# Patient Record
Sex: FEMALE | Race: BLACK OR AFRICAN AMERICAN | NOT HISPANIC OR LATINO | URBAN - METROPOLITAN AREA
[De-identification: names, ages, dates, MRNs, and addresses within clinical notes are randomized per-mention and may not be internally consistent; named-entity substitution may affect disease eponyms.]

---

## 2017-01-01 NOTE — PROVIDER CONTACT NOTE (OTHER) - BACKGROUND
S/P repeat C/S @ 39.3 weeks
s/p c/s at 1526. Intra op EBL 2000cc. Pt admitted to PACU at 1651.
repeat c/s 12/31, EBL 2000

## 2017-01-02 NOTE — LACTATION INITIAL EVALUATION - INTERVENTION OUTCOME
verbalizes understanding/demonstrates understanding of teaching/mother fitted for nipple shield with instruction and  not maintaining latch , mother shown how to hand express and fed  1/2 tsp of expressed colostrum and mother to hand express or breast pump every 2-3 hours and give 10-15cc of expressed colostrum and discussed feeding cues and to breastfeed 8-12 times in 24 hours and discussed breastfeeding log and assist prn, RN aware of plan/good return demonstration

## 2017-01-02 NOTE — DISCHARGE NOTE OB - PATIENT PORTAL LINK FT
“You can access the [facility] Patient Portal, offered by [facility], by registering with the following website: [email or web address]”

## 2017-01-02 NOTE — DISCHARGE NOTE OB - CARE PROVIDER_API CALL
Kayy Palumbo (MD), Obstetrics and Gynecology  [address] [place] [address]  Phone: [phone]  Fax: [phone]

## 2017-01-02 NOTE — DISCHARGE NOTE OB - MEDICATION SUMMARY - MEDICATIONS TO TAKE
I will START or STAY ON the medications listed below when I get home from the hospital:    prenatal vitamin  -- 1 tab(s) by mouth once a day  -- Indication: For breastfeeding    acetaminophen-oxyCODONE 325 mg-5 mg oral tablet  -- 1 tab(s) by mouth every 6 hours, As Needed -for moderate pain MDD:4  -- Caution federal law prohibits the transfer of this drug to any person other  than the person for whom it was prescribed.  May cause drowsiness.  Alcohol may intensify this effect.  Use care when operating dangerous machinery.  This prescription cannot be refilled.  This product contains acetaminophen.  Do not use  with any other product containing acetaminophen to prevent possible liver damage.  Using more of this medication than prescribed may cause serious breathing problems.    -- Indication: For pain

## 2017-01-02 NOTE — DISCHARGE NOTE OB - CARE PLAN
Principal Discharge DX:	 delivery, delivered, current hospitalization  Goal:	normal postoperative course  Instructions for follow-up, activity and diet:	nothing in vagina, no heavy lifting  Secondary Diagnosis:	Mild intermittent asthma without complication  Secondary Diagnosis:	History of liver biopsy  Secondary Diagnosis:	Maternal care due to low transverse uterine scar from previous  delivery  Secondary Diagnosis:	S/P lumpectomy of breast  Secondary Diagnosis:	S/P T&A (status post tonsillectomy and adenoidectomy)  Secondary Diagnosis:	S/P thyroid biopsy

## 2017-01-02 NOTE — DISCHARGE NOTE OB - SECONDARY DIAGNOSIS.
Mild intermittent asthma without complication History of liver biopsy Maternal care due to low transverse uterine scar from previous  delivery S/P lumpectomy of breast S/P T&A (status post tonsillectomy and adenoidectomy) S/P thyroid biopsy

## 2017-01-03 NOTE — PROVIDER CONTACT NOTE (OTHER) - SITUATION
cs 12/31 at 15:26
Urine output decreased, 25cc for past 1 hour, 55cc for previous hour. Pt 104KG.
incision oozing, MD sutherland saw this AM, applied pad herself to soak up drainage, incision still oozing this afternoon, MD Quintana made aware, MD Sutherland ordered CBC this AM
low urine output @ 21:00=30cc

## 2017-01-03 NOTE — PROVIDER CONTACT NOTE (OTHER) - ACTION/TREATMENT ORDERED:
OB resident made aware- pt. will follow up w urology outpt.
another pad applied to soak up drainage
IVF bolus 500cc
LR 500cc bolus started @ 19:02. To continue to assess.

## 2017-01-03 NOTE — PROVIDER CONTACT NOTE (OTHER) - ASSESSMENT
pt. requested bedpan, states that she has difficulty making it to bathroom when she has to void. denies pain or burning, states OB is aware and she has had this issue prior to and during preg.
vss, cb wnl
VSS. Uterus firm and 2 F above the umbilicus, lochia scant. Dressing CDI. IVF at 125cc/h. Urine dark yellow.
s/p [id number] bolus completed @ 19:45. urine output @ 20:00=25cc

## 2017-09-05 ENCOUNTER — OUTPATIENT (OUTPATIENT)
Dept: OUTPATIENT SERVICES | Facility: HOSPITAL | Age: 37
LOS: 1 days | End: 2017-09-05

## 2017-09-05 VITALS
DIASTOLIC BLOOD PRESSURE: 80 MMHG | OXYGEN SATURATION: 99 % | HEIGHT: 62 IN | WEIGHT: 218.04 LBS | SYSTOLIC BLOOD PRESSURE: 112 MMHG | HEART RATE: 67 BPM | RESPIRATION RATE: 14 BRPM | TEMPERATURE: 97 F

## 2017-09-05 DIAGNOSIS — Z98.890 OTHER SPECIFIED POSTPROCEDURAL STATES: Chronic | ICD-10-CM

## 2017-09-05 DIAGNOSIS — Z90.89 ACQUIRED ABSENCE OF OTHER ORGANS: Chronic | ICD-10-CM

## 2017-09-05 DIAGNOSIS — Z98.891 HISTORY OF UTERINE SCAR FROM PREVIOUS SURGERY: Chronic | ICD-10-CM

## 2017-09-05 DIAGNOSIS — J45.909 UNSPECIFIED ASTHMA, UNCOMPLICATED: ICD-10-CM

## 2017-09-05 DIAGNOSIS — G47.33 OBSTRUCTIVE SLEEP APNEA (ADULT) (PEDIATRIC): ICD-10-CM

## 2017-09-05 DIAGNOSIS — Z18.9 RETAINED FOREIGN BODY FRAGMENTS, UNSPECIFIED MATERIAL: ICD-10-CM

## 2017-09-05 DIAGNOSIS — M79.672 PAIN IN LEFT FOOT: ICD-10-CM

## 2017-09-05 DIAGNOSIS — Z98.89 OTHER SPECIFIED POSTPROCEDURAL STATES: Chronic | ICD-10-CM

## 2017-09-05 LAB
ALBUMIN SERPL ELPH-MCNC: 4.3 G/DL — SIGNIFICANT CHANGE UP (ref 3.3–5)
ALP SERPL-CCNC: 92 U/L — SIGNIFICANT CHANGE UP (ref 40–120)
ALT FLD-CCNC: 15 U/L — SIGNIFICANT CHANGE UP (ref 4–33)
AST SERPL-CCNC: 19 U/L — SIGNIFICANT CHANGE UP (ref 4–32)
BILIRUB SERPL-MCNC: 0.2 MG/DL — SIGNIFICANT CHANGE UP (ref 0.2–1.2)
BUN SERPL-MCNC: 16 MG/DL — SIGNIFICANT CHANGE UP (ref 7–23)
CALCIUM SERPL-MCNC: 9.4 MG/DL — SIGNIFICANT CHANGE UP (ref 8.4–10.5)
CHLORIDE SERPL-SCNC: 103 MMOL/L — SIGNIFICANT CHANGE UP (ref 98–107)
CO2 SERPL-SCNC: 27 MMOL/L — SIGNIFICANT CHANGE UP (ref 22–31)
CREAT SERPL-MCNC: 0.65 MG/DL — SIGNIFICANT CHANGE UP (ref 0.5–1.3)
GLUCOSE SERPL-MCNC: 87 MG/DL — SIGNIFICANT CHANGE UP (ref 70–99)
HCT VFR BLD CALC: 40.4 % — SIGNIFICANT CHANGE UP (ref 34.5–45)
HGB BLD-MCNC: 12.6 G/DL — SIGNIFICANT CHANGE UP (ref 11.5–15.5)
MCHC RBC-ENTMCNC: 26.7 PG — LOW (ref 27–34)
MCHC RBC-ENTMCNC: 31.2 % — LOW (ref 32–36)
MCV RBC AUTO: 85.6 FL — SIGNIFICANT CHANGE UP (ref 80–100)
NRBC # FLD: 0 — SIGNIFICANT CHANGE UP
PLATELET # BLD AUTO: 297 K/UL — SIGNIFICANT CHANGE UP (ref 150–400)
PMV BLD: 10.3 FL — SIGNIFICANT CHANGE UP (ref 7–13)
POTASSIUM SERPL-MCNC: 4.2 MMOL/L — SIGNIFICANT CHANGE UP (ref 3.5–5.3)
POTASSIUM SERPL-SCNC: 4.2 MMOL/L — SIGNIFICANT CHANGE UP (ref 3.5–5.3)
PROT SERPL-MCNC: 7.8 G/DL — SIGNIFICANT CHANGE UP (ref 6–8.3)
RBC # BLD: 4.72 M/UL — SIGNIFICANT CHANGE UP (ref 3.8–5.2)
RBC # FLD: 13.5 % — SIGNIFICANT CHANGE UP (ref 10.3–14.5)
SODIUM SERPL-SCNC: 141 MMOL/L — SIGNIFICANT CHANGE UP (ref 135–145)
WBC # BLD: 4.85 K/UL — SIGNIFICANT CHANGE UP (ref 3.8–10.5)
WBC # FLD AUTO: 4.85 K/UL — SIGNIFICANT CHANGE UP (ref 3.8–10.5)

## 2017-09-05 NOTE — H&P PST ADULT - PROBLEM SELECTOR PLAN 1
pt is scheduled for removal of foreign body in left foot on 9/7/17. Preop instructions including Hibiclens, Pepcid, NPO status provided. Pt to finish AXB ordered (ends on 9/6) and may continue PRN percocet as ordered.

## 2017-09-05 NOTE — H&P PST ADULT - NEGATIVE GENERAL GENITOURINARY SYMPTOMS
no hematuria/no nocturia/no urinary hesitancy/no renal colic/no incontinence/normal urinary frequency/no flank pain R/no flank pain L/no dysuria/no urine discoloration/no bladder infections

## 2017-09-05 NOTE — H&P PST ADULT - NEGATIVE GASTROINTESTINAL SYMPTOMS
no melena/no diarrhea/no flatulence/no abdominal pain/no vomiting/no change in bowel habits/no constipation/no nausea

## 2017-09-05 NOTE — H&P PST ADULT - MUSCULOSKELETAL COMMENTS
DX: DX: pain in left foot, retained foreign body fragments unspecified material: left. Specialty boot and dsg in place, dci. DX: pain in left foot, retained foreign body fragments unspecified material: left

## 2017-09-05 NOTE — H&P PST ADULT - PSH
H/O myomectomy    H/O:   2010  History of liver biopsy  2015  S/P     S/P lumpectomy of breast  left   S/P T&A (status post tonsillectomy and adenoidectomy)    S/P thyroid biopsy  2012 benign

## 2017-09-05 NOTE — H&P PST ADULT - NEGATIVE ALLERGY TYPES
no reactions to animals/no outdoor environmental allergies/no reactions to insect bites/no reactions to medicines/no reactions to food/no indoor environmental allergies

## 2017-09-05 NOTE — H&P PST ADULT - RS GEN PE MLT RESP DETAILS PC
respirations non-labored/no rales/good air movement/airway patent/no rhonchi/breath sounds equal/no wheezes

## 2017-09-05 NOTE — H&P PST ADULT - HISTORY OF PRESENT ILLNESS
37 y/o female with PMH of Asthma (denies intubations or hospitalizations). Presents to [facility] with a preop dx of pain in left foot, retained foreign body fragments unspecified material: left, and to be evaluated for a scheduled removal of foreign body in left foot on 9/7/17. Pt states was cleaning at home and stepped on a needle that was unable to remove. Pt saw Dr Bocanegra who recommended surgical intervention at this time.

## 2017-09-05 NOTE — H&P PST ADULT - FAMILY HISTORY
Mother  Still living? Yes, Estimated age: Age Unknown  Family history of hypertension, Age at diagnosis: Age Unknown  Family history of hyperlipidemia, Age at diagnosis: Age Unknown     Grandparent  Still living? No  Family history of diabetes mellitus (DM), Age at diagnosis: Age Unknown  Family history of colon cancer, Age at diagnosis: Age Unknown  Family history of CHF (congestive heart failure), Age at diagnosis: Age Unknown

## 2017-09-05 NOTE — H&P PST ADULT - NEGATIVE PSYCHIATRIC SYMPTOMS
no insomnia/no memory loss/no paranoia/no mood swings/no anxiety/no agitation/no auditory hallucinations/no hyperactivity/no visual hallucinations/no suicidal ideation/no depression

## 2017-09-05 NOTE — H&P PST ADULT - NEGATIVE MUSCULOSKELETAL SYMPTOMS
no stiffness/no arm pain L/no neck pain/no joint swelling/no muscle cramps/no muscle weakness/no back pain/no leg pain R/no arthralgia/no arthritis/no myalgia

## 2017-09-05 NOTE — H&P PST ADULT - NEGATIVE NEUROLOGICAL SYMPTOMS
no vertigo/no weakness/no loss of sensation/no difficulty walking/no confusion/no headache/no paresthesias/no facial palsy/no focal seizures/no hemiparesis/no transient paralysis/no syncope/no generalized seizures/no loss of consciousness/no tremors

## 2017-09-05 NOTE — H&P PST ADULT - NSANTHOSAYNRD_GEN_A_CORE
Never tested/No. QUETA screening performed.  STOP BANG Legend: 0-2 = LOW Risk; 3-4 = INTERMEDIATE Risk; 5-8 = HIGH Risk

## 2017-09-05 NOTE — H&P PST ADULT - NEGATIVE CARDIOVASCULAR SYMPTOMS
no orthopnea/no claudication/no peripheral edema/no palpitations/no chest pain/no paroxysmal nocturnal dyspnea/no dyspnea on exertion

## 2017-09-05 NOTE — H&P PST ADULT - ATTENDING COMMENTS
I agree with above. Patient presents for removal of left foot foreign body of hallux. All risks and benefits have been discussed in depth. Consent signed. Post operative instructions discussed with patient. She agrees to surgery.

## 2017-09-05 NOTE — H&P PST ADULT - NEGATIVE OPHTHALMOLOGIC SYMPTOMS
no pain L/no irritation L/no diplopia/no loss of vision R/no scleral injection L/no lacrimation R/no blurred vision R/no lacrimation L/no photophobia/no blurred vision L/no discharge R/no discharge L/no pain R/no scleral injection R/no irritation R/no loss of vision L

## 2017-09-05 NOTE — H&P PST ADULT - PMH
Asthma    Liver lesion    Maternal care due to uterine scar from previous surgery    Multinodular goiter

## 2017-09-06 ENCOUNTER — TRANSCRIPTION ENCOUNTER (OUTPATIENT)
Age: 37
End: 2017-09-06

## 2017-09-07 ENCOUNTER — OUTPATIENT (OUTPATIENT)
Dept: OUTPATIENT SERVICES | Facility: HOSPITAL | Age: 37
LOS: 1 days | Discharge: HOME | End: 2017-09-07
Payer: COMMERCIAL

## 2017-09-07 ENCOUNTER — RESULT REVIEW (OUTPATIENT)
Age: 37
End: 2017-09-07

## 2017-09-07 VITALS
RESPIRATION RATE: 16 BRPM | OXYGEN SATURATION: 100 % | TEMPERATURE: 36 F | HEART RATE: 78 BPM | WEIGHT: 218.04 LBS | SYSTOLIC BLOOD PRESSURE: 126 MMHG | DIASTOLIC BLOOD PRESSURE: 93 MMHG | HEIGHT: 61.81 IN

## 2017-09-07 VITALS — HEART RATE: 57 BPM | OXYGEN SATURATION: 99 % | RESPIRATION RATE: 18 BRPM

## 2017-09-07 DIAGNOSIS — Z18.9 RETAINED FOREIGN BODY FRAGMENTS, UNSPECIFIED MATERIAL: ICD-10-CM

## 2017-09-07 DIAGNOSIS — Z98.890 OTHER SPECIFIED POSTPROCEDURAL STATES: Chronic | ICD-10-CM

## 2017-09-07 DIAGNOSIS — Z98.89 OTHER SPECIFIED POSTPROCEDURAL STATES: Chronic | ICD-10-CM

## 2017-09-07 DIAGNOSIS — Z98.891 HISTORY OF UTERINE SCAR FROM PREVIOUS SURGERY: Chronic | ICD-10-CM

## 2017-09-07 DIAGNOSIS — Z90.89 ACQUIRED ABSENCE OF OTHER ORGANS: Chronic | ICD-10-CM

## 2017-09-07 PROCEDURE — 88300 SURGICAL PATH GROSS: CPT | Mod: 26

## 2017-09-07 NOTE — ASU DISCHARGE PLAN (ADULT/PEDIATRIC). - NOTIFY
Unable to Urinate/Persistent Nausea and Vomiting/Pain not relieved by Medications/Swelling that continues/Fever greater than 101/Inability to Tolerate Liquids or Foods/Bleeding that does not stop

## 2017-09-08 NOTE — BRIEF OPERATIVE NOTE - OPERATION/FINDINGS
L hallux foreign body removal. Sewing needle. Broken at sharp end, c/w pre-op xrays. No purulence, no acute SOI.

## 2017-10-02 ENCOUNTER — EMERGENCY (EMERGENCY)
Facility: HOSPITAL | Age: 37
LOS: 1 days | Discharge: HOME | End: 2017-10-02
Attending: EMERGENCY MEDICINE | Admitting: EMERGENCY MEDICINE
Payer: COMMERCIAL

## 2017-10-02 VITALS
TEMPERATURE: 99 F | RESPIRATION RATE: 18 BRPM | SYSTOLIC BLOOD PRESSURE: 134 MMHG | HEIGHT: 62 IN | WEIGHT: 220.02 LBS | DIASTOLIC BLOOD PRESSURE: 94 MMHG | HEART RATE: 95 BPM | OXYGEN SATURATION: 99 %

## 2017-10-02 VITALS
SYSTOLIC BLOOD PRESSURE: 135 MMHG | TEMPERATURE: 99 F | OXYGEN SATURATION: 100 % | RESPIRATION RATE: 17 BRPM | HEART RATE: 66 BPM | DIASTOLIC BLOOD PRESSURE: 65 MMHG

## 2017-10-02 DIAGNOSIS — Z98.89 OTHER SPECIFIED POSTPROCEDURAL STATES: Chronic | ICD-10-CM

## 2017-10-02 DIAGNOSIS — Z98.890 OTHER SPECIFIED POSTPROCEDURAL STATES: Chronic | ICD-10-CM

## 2017-10-02 DIAGNOSIS — Z98.891 HISTORY OF UTERINE SCAR FROM PREVIOUS SURGERY: Chronic | ICD-10-CM

## 2017-10-02 DIAGNOSIS — Z90.89 ACQUIRED ABSENCE OF OTHER ORGANS: Chronic | ICD-10-CM

## 2017-10-02 LAB
ALBUMIN SERPL ELPH-MCNC: 3.9 G/DL — SIGNIFICANT CHANGE UP (ref 3.4–5)
ALP SERPL-CCNC: 102 U/L — SIGNIFICANT CHANGE UP (ref 40–120)
ALT FLD-CCNC: 22 U/L — SIGNIFICANT CHANGE UP (ref 12–42)
ANION GAP SERPL CALC-SCNC: 5 MMOL/L — LOW (ref 9–16)
APPEARANCE UR: CLEAR — SIGNIFICANT CHANGE UP
APTT BLD: 34.8 SEC — SIGNIFICANT CHANGE UP (ref 27.5–36.5)
AST SERPL-CCNC: 18 U/L — SIGNIFICANT CHANGE UP (ref 15–37)
BASOPHILS NFR BLD AUTO: 0.2 % — SIGNIFICANT CHANGE UP (ref 0–2)
BILIRUB SERPL-MCNC: 0.3 MG/DL — SIGNIFICANT CHANGE UP (ref 0.2–1.2)
BILIRUB UR-MCNC: NEGATIVE — SIGNIFICANT CHANGE UP
BUN SERPL-MCNC: 17 MG/DL — SIGNIFICANT CHANGE UP (ref 7–23)
CALCIUM SERPL-MCNC: 9.1 MG/DL — SIGNIFICANT CHANGE UP (ref 8.5–10.5)
CHLORIDE SERPL-SCNC: 101 MMOL/L — SIGNIFICANT CHANGE UP (ref 96–108)
CK SERPL-CCNC: 99 U/L — SIGNIFICANT CHANGE UP (ref 26–192)
CO2 SERPL-SCNC: 28 MMOL/L — SIGNIFICANT CHANGE UP (ref 22–31)
COLOR SPEC: YELLOW — SIGNIFICANT CHANGE UP
CREAT SERPL-MCNC: 0.82 MG/DL — SIGNIFICANT CHANGE UP (ref 0.5–1.3)
D DIMER BLD IA.RAPID-MCNC: <150 NG/ML DDU — SIGNIFICANT CHANGE UP
DIFF PNL FLD: NEGATIVE — SIGNIFICANT CHANGE UP
EOSINOPHIL NFR BLD AUTO: 1.3 % — SIGNIFICANT CHANGE UP (ref 0–6)
GLUCOSE SERPL-MCNC: 90 MG/DL — SIGNIFICANT CHANGE UP (ref 70–99)
GLUCOSE UR QL: NEGATIVE — SIGNIFICANT CHANGE UP
HCG UR QL: NEGATIVE — SIGNIFICANT CHANGE UP
HCT VFR BLD CALC: 36.7 % — SIGNIFICANT CHANGE UP (ref 34.5–45)
HGB BLD-MCNC: 11.9 G/DL — SIGNIFICANT CHANGE UP (ref 11.5–15.5)
IMM GRANULOCYTES NFR BLD AUTO: 0.2 % — SIGNIFICANT CHANGE UP (ref 0–1.5)
INR BLD: 1.14 — SIGNIFICANT CHANGE UP (ref 0.88–1.16)
KETONES UR-MCNC: NEGATIVE — SIGNIFICANT CHANGE UP
LEUKOCYTE ESTERASE UR-ACNC: NEGATIVE — SIGNIFICANT CHANGE UP
LYMPHOCYTES # BLD AUTO: 43.5 % — SIGNIFICANT CHANGE UP (ref 13–44)
MCHC RBC-ENTMCNC: 27.6 PG — SIGNIFICANT CHANGE UP (ref 27–34)
MCHC RBC-ENTMCNC: 32.4 G/DL — SIGNIFICANT CHANGE UP (ref 32–36)
MCV RBC AUTO: 85.2 FL — SIGNIFICANT CHANGE UP (ref 80–100)
MONOCYTES NFR BLD AUTO: 9.1 % — SIGNIFICANT CHANGE UP (ref 2–14)
NEUTROPHILS NFR BLD AUTO: 45.7 % — SIGNIFICANT CHANGE UP (ref 43–77)
NITRITE UR-MCNC: NEGATIVE — SIGNIFICANT CHANGE UP
PH UR: 7 — SIGNIFICANT CHANGE UP (ref 5–8)
PLATELET # BLD AUTO: 277 K/UL — SIGNIFICANT CHANGE UP (ref 150–400)
POTASSIUM SERPL-MCNC: 4 MMOL/L — SIGNIFICANT CHANGE UP (ref 3.5–5.3)
POTASSIUM SERPL-SCNC: 4 MMOL/L — SIGNIFICANT CHANGE UP (ref 3.5–5.3)
PROT SERPL-MCNC: 7.9 G/DL — SIGNIFICANT CHANGE UP (ref 6.4–8.2)
PROT UR-MCNC: NEGATIVE MG/DL — SIGNIFICANT CHANGE UP
PROTHROM AB SERPL-ACNC: 12.6 SEC — SIGNIFICANT CHANGE UP (ref 9.8–12.7)
RBC # BLD: 4.31 M/UL — SIGNIFICANT CHANGE UP (ref 3.8–5.2)
RBC # FLD: 13.8 % — SIGNIFICANT CHANGE UP (ref 10.3–16.9)
SODIUM SERPL-SCNC: 134 MMOL/L — SIGNIFICANT CHANGE UP (ref 132–145)
SP GR SPEC: 1.01 — SIGNIFICANT CHANGE UP (ref 1–1.03)
TROPONIN I SERPL-MCNC: <0.017 NG/ML — LOW (ref 0.02–0.06)
TSH SERPL-MCNC: 1.16 UIU/ML — SIGNIFICANT CHANGE UP (ref 0.36–3.74)
UROBILINOGEN FLD QL: 0.2 E.U./DL — SIGNIFICANT CHANGE UP
WBC # BLD: 5.4 K/UL — SIGNIFICANT CHANGE UP (ref 3.8–10.5)
WBC # FLD AUTO: 5.4 K/UL — SIGNIFICANT CHANGE UP (ref 3.8–10.5)

## 2017-10-02 PROCEDURE — 93010 ELECTROCARDIOGRAM REPORT: CPT | Mod: NC

## 2017-10-02 PROCEDURE — 71010: CPT | Mod: 26

## 2017-10-02 PROCEDURE — 99285 EMERGENCY DEPT VISIT HI MDM: CPT | Mod: 25

## 2017-10-02 RX ORDER — MECLIZINE HCL 12.5 MG
1 TABLET ORAL
Qty: 30 | Refills: 0 | OUTPATIENT
Start: 2017-10-02 | End: 2017-10-12

## 2017-10-02 RX ORDER — MECLIZINE HCL 12.5 MG
25 TABLET ORAL ONCE
Qty: 0 | Refills: 0 | Status: COMPLETED | OUTPATIENT
Start: 2017-10-02 | End: 2017-10-02

## 2017-10-02 RX ORDER — ACETAMINOPHEN 500 MG
650 TABLET ORAL ONCE
Qty: 0 | Refills: 0 | Status: COMPLETED | OUTPATIENT
Start: 2017-10-02 | End: 2017-10-02

## 2017-10-02 RX ORDER — SODIUM CHLORIDE 9 MG/ML
1000 INJECTION INTRAMUSCULAR; INTRAVENOUS; SUBCUTANEOUS ONCE
Qty: 0 | Refills: 0 | Status: COMPLETED | OUTPATIENT
Start: 2017-10-02 | End: 2017-10-02

## 2017-10-02 RX ORDER — SODIUM CHLORIDE 9 MG/ML
3 INJECTION INTRAMUSCULAR; INTRAVENOUS; SUBCUTANEOUS ONCE
Qty: 0 | Refills: 0 | Status: COMPLETED | OUTPATIENT
Start: 2017-10-02 | End: 2017-10-02

## 2017-10-02 RX ADMIN — SODIUM CHLORIDE 3 MILLILITER(S): 9 INJECTION INTRAMUSCULAR; INTRAVENOUS; SUBCUTANEOUS at 19:31

## 2017-10-02 RX ADMIN — Medication 650 MILLIGRAM(S): at 19:30

## 2017-10-02 RX ADMIN — Medication 25 MILLIGRAM(S): at 20:55

## 2017-10-02 RX ADMIN — SODIUM CHLORIDE 1000 MILLILITER(S): 9 INJECTION INTRAMUSCULAR; INTRAVENOUS; SUBCUTANEOUS at 19:30

## 2017-10-02 NOTE — ED PROVIDER NOTE - OBJECTIVE STATEMENT
hx of goiter (not on meds) 9 months post partum presents with lightheadedness.  x 4-5 days, intermittent, worse with prolonged standing or standing from a seated position.  Denies headache, palpitations but endorses R sided chest pain.  No leg pain, swelling.  Denies exertional symptoms.  No travel history, hormone medication use.

## 2017-10-02 NOTE — ED SUB INTERN NOTE - GENERIC SYMPTOMS POSITIVE
pt c/o 3-4 nearsyncopal episodes a day over the past week. denies actual syncope. associated with midsternal chest pressure, blurred vision, photophobia and lightheadedness, recent substantial diet change as per pt.  episodes are exacerbated with exertion

## 2017-10-02 NOTE — ED PROVIDER NOTE - PROGRESS NOTE DETAILS
received pt in s/o from Dr. Fermin with d-dimer pending. Pt seen and examined by me, joannes. Pt endorses sudden onset waves of dizziness and "fogginess" with sudden head turning, no recent uri, air travel, diving, denies tinnitus.   exam: +Barany maneuever.  Imp: probable acute labyrinthitis  plan: meclizine.

## 2017-10-02 NOTE — ED PROVIDER NOTE - MEDICAL DECISION MAKING DETAILS
lightheadedness/near syncope x 4-5 days.  positional in nature also with R sided chest pain.  NO PE or DV risk factors.  No pertinent family history.  + goiter history, not treated.  Well appearing.  No murmurs, no LE edema.  Labs, fluids, urine, chest films.

## 2017-10-06 DIAGNOSIS — R42 DIZZINESS AND GIDDINESS: ICD-10-CM

## 2017-10-06 DIAGNOSIS — R07.89 OTHER CHEST PAIN: ICD-10-CM

## 2017-10-06 DIAGNOSIS — H83.09 LABYRINTHITIS, UNSPECIFIED EAR: ICD-10-CM

## 2019-10-24 NOTE — ASU PREOP CHECKLIST - VERIFY SURGICAL SITE/SIDE WITH PATIENT
Assessment and Plan


Assessment and plan: 





(1) Atrial fibrillation with RVR


Current Visit: Yes   Status: Acute   


Plan to address problem: 


Cardizem for rate control, cardiology consulted


On eliquis


Pt cardioverted S/P loading dose of cardizem.   Currently in SR





Hyperthyroidism


- TSH <0.005 and free t4 2.45


- patient was on methmazole and I d/lyly it because the NM scan was normal


- Thyroid U/S ordered








(2) Acute systolic CHF (congestive heart failure)


- On appropriate CHF medications








(3) Severe malnutrition


Current Visit: Yes   Status: Acute   


Plan to address problem: 


Encourage increased protein intake, dietary supplementation








(4) COPD (chronic obstructive pulmonary disease)


Current Visit: Yes   Status: Acute   


Qualifiers: 


   Chronic bronchitis type: mixed simple and mucopurulent 


Plan to address problem: 


supplemental oxygen, nebulizer therapy, NIPPV as clinically indicated. 








(5) Non compliance w medication regimen


Current Visit: Yes   Status: Acute   


Plan to address problem: 


Behavior change counseling








(6) DVT prophylaxis


Current Visit: Yes   Status: Acute   


Plan to address problem: 


SCD to BLE while in bed, therapeutic anticoagulation





History


Interval history: 





Patient was seen and evaluated this morning, patient has dizziness when he is 

walking. Patient is on IN oxygen.





Hospitalist Physical





- Physical exam


Narrative exam: 





 Not in cardiopulmonary distress. 


 The patient appeared well nourished and normally developed.


 Vital signs as documented.


 Head exam is unremarkable.


 No scleral icterus .


 Neck is without jugular venous distension, thyromegaly, or carotid bruits. 


 Lungs are clear to auscultation.


Cardiac exam reveals regular rate and  Rhythm.


Abdominal exam reveals normal bowel sounds. 


Extremities are nonedematous and both femoral and pedal pulses are normal.


CNS: Alert and oriented 3.  No focal weakness.





- Constitutional


Vitals: 


                                        











Temp Pulse Resp BP Pulse Ox


 


 98.3 F   76   20   137/86   97 


 


 10/24/19 08:06  10/24/19 08:06  10/24/19 08:06  10/24/19 08:06  10/24/19 08:06











General appearance: Present: no acute distress





Results





- Labs


CBC & Chem 7: 


                                 10/21/19 14:32





                                 10/23/19 06:01


Labs: 


                             Laboratory Last Values











WBC  5.0 K/mm3 (4.5-11.0)   10/21/19  14:32    


 


RBC  4.20 M/mm3 (3.65-5.03)   10/21/19  14:32    


 


Hgb  8.6 gm/dl (11.8-15.2)  L  10/21/19  14:32    


 


Hct  28.5 % (35.5-45.6)  L  10/21/19  14:32    


 


MCV  68 fl (84-94)  L  10/21/19  14:32    


 


MCH  21 pg (28-32)  L  10/21/19  14:32    


 


MCHC  30 % (32-34)  L  10/21/19  14:32    


 


RDW  21.9 % (13.2-15.2)  H  10/21/19  14:32    


 


Plt Count  149 K/mm3 (140-440)   10/21/19  14:32    


 


Lymph % (Auto)  23.5 % (13.4-35.0)   10/21/19  14:32    


 


Mono % (Auto)  14.1 % (0.0-7.3)  H  10/21/19  14:32    


 


Eos % (Auto)  0.6 % (0.0-4.3)   10/21/19  14:32    


 


Baso % (Auto)  0.3 % (0.0-1.8)   10/21/19  14:32    


 


Lymph #  1.2 K/mm3 (1.2-5.4)   10/21/19  14:32    


 


Mono #  0.7 K/mm3 (0.0-0.8)   10/21/19  14:32    


 


Eos #  0.0 K/mm3 (0.0-0.4)   10/21/19  14:32    


 


Baso #  0.0 K/mm3 (0.0-0.1)   10/21/19  14:32    


 


Seg Neutrophils %  61.5 % (40.0-70.0)   10/21/19  14:32    


 


Seg Neutrophils #  3.1 K/mm3 (1.8-7.7)   10/21/19  14:32    


 


PT  14.7 Sec. (12.2-14.9)   10/21/19  14:32    


 


INR  1.18  (0.87-1.13)  H  10/21/19  14:32    


 


APTT  37.5 Sec. (24.2-36.6)  H  10/21/19  14:32    


 


Sodium  140 mmol/L (137-145)   10/23/19  06:01    


 


Potassium  4.6 mmol/L (3.6-5.0)   10/23/19  06:01    


 


Chloride  105.1 mmol/L ()   10/23/19  06:01    


 


Carbon Dioxide  19 mmol/L (22-30)  L  10/23/19  06:01    


 


Anion Gap  21 mmol/L  10/23/19  06:01    


 


BUN  26 mg/dL (9-20)  H  10/23/19  06:01    


 


Creatinine  1.1 mg/dL (0.8-1.5)   10/23/19  06:01    


 


Estimated GFR  > 60 ml/min  10/23/19  06:01    


 


BUN/Creatinine Ratio  24 %  10/23/19  06:01    


 


Glucose  83 mg/dL ()   10/23/19  06:01    


 


POC Glucose  66  ()  L  10/22/19  16:46    


 


Lactic Acid  1.80 mmol/L (0.7-2.0)   10/21/19  16:16    


 


Calcium  8.6 mg/dL (8.4-10.2)   10/23/19  06:01    


 


Magnesium  2.20 mg/dL (1.7-2.3)   10/21/19  20:08    


 


Troponin T  < 0.010 ng/mL (0.00-0.029)   10/21/19  20:08    


 


NT-Pro-B Natriuret Pep  5264 pg/mL (0-900)  H  10/21/19  16:16    


 


TSH  < 0.005 mlU/mL (0.270-4.200)  L  10/21/19  16:16    


 


Free T4  1.90 ng/dL (0.76-1.46)  H  10/22/19  14:31    


 


Urine Color  Yellow  (Yellow)   10/21/19  16:30    


 


Urine Turbidity  Clear  (Clear)   10/21/19  16:30    


 


Urine pH  6.0  (5.0-7.0)   10/21/19  16:30    


 


Ur Specific Gravity  1.014  (1.003-1.030)   10/21/19  16:30    


 


Urine Protein  <15 mg/dl mg/dL (Negative)   10/21/19  16:30    


 


Urine Glucose (UA)  Neg mg/dL (Negative)   10/21/19  16:30    


 


Urine Ketones  Neg mg/dL (Negative)   10/21/19  16:30    


 


Urine Blood  Neg  (Negative)   10/21/19  16:30    


 


Urine Nitrite  Neg  (Negative)   10/21/19  16:30    


 


Urine Bilirubin  Neg  (Negative)   10/21/19  16:30    


 


Urine Urobilinogen  < 2.0 mg/dL (<2.0)   10/21/19  16:30    


 


Ur Leukocyte Esterase  Tr  (Negative)   10/21/19  16:30    


 


Urine WBC (Auto)  2.0 /HPF (0.0-6.0)   10/21/19  16:30    


 


Urine RBC (Auto)  1.0 /HPF (0.0-6.0)   10/21/19  16:30    


 


U Epithel Cells (Auto)  < 1.0 /HPF (0-13.0)   10/21/19  16:30    


 


Salicylates  < 0.3 mg/dL (2.8-20.0)  L  10/21/19  07:28    


 


Acetaminophen  < 5.0 ug/mL (10.0-30.0)  L  10/21/19  07:28    


 


Plasma/Serum Alcohol  < 0.01 % (0-0.07)   10/21/19  16:16    


 


Syphilis IgG Antibody  Non-reactive  (NonReactive)   10/22/19  15:00    














Active Medications





- Current Medications


Current Medications: 














Generic Name Dose Route Start Last Admin





  Trade Name Freq  PRN Reason Stop Dose Admin


 


Acetaminophen  500 mg  10/21/19 17:00  10/24/19 10:55





  Tylenol  PO   500 mg





  Q6H CLAUDIA   Administration


 


Apixaban  2.5 mg  10/22/19 12:00  10/24/19 10:55





  Eliquis  PO   2.5 mg





  Q12HR CLAUDIA   Administration





  Protocol  


 


Lisinopril  2.5 mg  10/24/19 10:00  10/24/19 10:55





  Zestril  PO   2.5 mg





  QDAY CLAUDIA   Administration


 


Metoprolol Succinate  50 mg  10/23/19 13:00  10/24/19 08:07





  Toprol Xl  PO   50 mg





  Q12H CLAUDIA   Administration


 


Ondansetron HCl  4 mg  10/21/19 16:30  10/22/19 21:17





  Zofran  IV   4 mg





  Q8H PRN   Administration





  Nausea And Vomiting  


 


Pantoprazole Sodium  40 mg  10/22/19 10:00  10/24/19 10:56





  Protonix  PO   40 mg





  DAILY CLAUDIA   Administration


 


Sodium Chloride  10 ml  10/21/19 22:00  10/24/19 11:02





  Sodium Chloride Flush Syringe 10 Ml  IV   10 ml





  BID CLAUDIA   Administration


 


Sodium Chloride  10 ml  10/21/19 16:30 





  Sodium Chloride Flush Syringe 10 Ml  IV  





  PRN PRN  





  LINE FLUSH  














Nutrition/Malnutrition Assess





- Dietary Evaluation


Nutrition/Malnutrition Findings: 


                                        





Nutrition Notes                                            Start:  10/22/19 

10:06


Freq:                                                      Status: Active       




Protocol:                                                                       




 Document     10/24/19 11:38  AP  (Rec: 10/24/19 11:54  AP  [id number])


 Co-Sign      10/24/19 11:38  KH


 Nutrition Notes


     Need for Assessment generated from:         RN Referral


     Current Diagnosis                           COPD


     Other Pertinent Diagnosis                   Pneu, GERD, Anemia, CHF, A-Fib


     Current Diet                                Consistent CHO/Cardiac


     Labs/Tests                                  Reviewed


     Pertinent Medications                       Reviewed


     Height                                      6 ft 3 in


     Weight                                      48.3 kg


     Ideal Body Weight (kg)                      89.09


     BMI                                         13.3


     Intake Prior to Admission                   Poor


     Weight Status                               Emaciated


     Subjective/Other Information                F/U for PO/ONS intakes. Pt


                                                 consumed 80% of bfast tray and


                                                 80% of ONS.


     Percent of energy/protein needs met:        85%/72%


     Burn                                        Absent


     Trauma                                      Absent


     Minimum of two criteria                     Yes


     Interpretation of Weight Loss (severe)      >7.5% in 3 months


     Body Fat Depletion                          Moderate depletion (severe)


     Muscle Mass                                 Moderate Depletion (severe)


     #1


      Nutrition Diagnosis                        Malnutrition


      Diagnosis Progress(for reassessment        Continues


       documentation)                            


     Is patient on ventilator?                   No


     Is Patient Ambulatory and/or Out of Bed     Yes


     REE-([facility]-ambulatory/OOB) [     1694.719


      [email or web address]]                               


     Kcal/Kg value to use for calculation        45


     Approximate Energy Requirements Using       2174


      kcal/Kg                                    


     Calculation Used for Recommendations        Kcal/kg


     Additional Notes                            PRO needs: 106-133g/day (1.2-1


                                                 .5g/kg/IBW 89 kg)


                                                 Fluid: 1ml/kcal or per MD


 Nutrition Intervention


     Change Diet Order:                          Continue Consistent CHO/


                                                 Cardiac diet.


     Add Supplement/Snack (indicate name/kcal    Ensure Enlive


      /protein )                                 


     Provides kCal:                              350


     Provides Protein (gm)                       20


     Goal #1                                     Pt meet >80% of kcal/PRO needs


                                                 via PO/ONS intakes.


     Anticipated Discharge Needs:                Cardiac diet


     Follow-Up By:                               10/25/19


     Additional Comments                         F/U for stable PO/ONS intakes. left foot/done

## 2019-12-23 ENCOUNTER — EMERGENCY (EMERGENCY)
Facility: HOSPITAL | Age: 39
LOS: 1 days | Discharge: HOME | End: 2019-12-23
Attending: HOSPITALIST | Admitting: HOSPITALIST
Payer: COMMERCIAL

## 2019-12-23 VITALS
RESPIRATION RATE: 20 BRPM | SYSTOLIC BLOOD PRESSURE: 156 MMHG | TEMPERATURE: 99 F | HEART RATE: 104 BPM | OXYGEN SATURATION: 100 % | DIASTOLIC BLOOD PRESSURE: 89 MMHG

## 2019-12-23 VITALS — SYSTOLIC BLOOD PRESSURE: 116 MMHG | DIASTOLIC BLOOD PRESSURE: 72 MMHG

## 2019-12-23 DIAGNOSIS — Z98.890 OTHER SPECIFIED POSTPROCEDURAL STATES: Chronic | ICD-10-CM

## 2019-12-23 DIAGNOSIS — Z98.89 OTHER SPECIFIED POSTPROCEDURAL STATES: Chronic | ICD-10-CM

## 2019-12-23 DIAGNOSIS — Z98.891 HISTORY OF UTERINE SCAR FROM PREVIOUS SURGERY: Chronic | ICD-10-CM

## 2019-12-23 DIAGNOSIS — Z90.89 ACQUIRED ABSENCE OF OTHER ORGANS: Chronic | ICD-10-CM

## 2019-12-23 LAB
ALBUMIN SERPL ELPH-MCNC: 3.8 G/DL — SIGNIFICANT CHANGE UP (ref 3.3–5)
ALP SERPL-CCNC: 81 U/L — SIGNIFICANT CHANGE UP (ref 40–120)
ALT FLD-CCNC: 12 U/L — SIGNIFICANT CHANGE UP (ref 4–33)
ANION GAP SERPL CALC-SCNC: 8 MMO/L — SIGNIFICANT CHANGE UP (ref 7–14)
APTT BLD: 28 SEC — SIGNIFICANT CHANGE UP (ref 27.5–36.3)
AST SERPL-CCNC: 14 U/L — SIGNIFICANT CHANGE UP (ref 4–32)
BASOPHILS # BLD AUTO: 0.03 K/UL — SIGNIFICANT CHANGE UP (ref 0–0.2)
BASOPHILS NFR BLD AUTO: 0.3 % — SIGNIFICANT CHANGE UP (ref 0–2)
BILIRUB SERPL-MCNC: < 0.2 MG/DL — LOW (ref 0.2–1.2)
BUN SERPL-MCNC: 10 MG/DL — SIGNIFICANT CHANGE UP (ref 7–23)
CALCIUM SERPL-MCNC: 9 MG/DL — SIGNIFICANT CHANGE UP (ref 8.4–10.5)
CHLORIDE SERPL-SCNC: 105 MMOL/L — SIGNIFICANT CHANGE UP (ref 98–107)
CO2 SERPL-SCNC: 23 MMOL/L — SIGNIFICANT CHANGE UP (ref 22–31)
CREAT SERPL-MCNC: 0.72 MG/DL — SIGNIFICANT CHANGE UP (ref 0.5–1.3)
EOSINOPHIL # BLD AUTO: 0.26 K/UL — SIGNIFICANT CHANGE UP (ref 0–0.5)
EOSINOPHIL NFR BLD AUTO: 3 % — SIGNIFICANT CHANGE UP (ref 0–6)
GLUCOSE SERPL-MCNC: 102 MG/DL — HIGH (ref 70–99)
HCT VFR BLD CALC: 32.9 % — LOW (ref 34.5–45)
HGB BLD-MCNC: 9.9 G/DL — LOW (ref 11.5–15.5)
IMM GRANULOCYTES NFR BLD AUTO: 0.5 % — SIGNIFICANT CHANGE UP (ref 0–1.5)
INR BLD: 1.03 — SIGNIFICANT CHANGE UP (ref 0.88–1.17)
LYMPHOCYTES # BLD AUTO: 2.09 K/UL — SIGNIFICANT CHANGE UP (ref 1–3.3)
LYMPHOCYTES # BLD AUTO: 24.1 % — SIGNIFICANT CHANGE UP (ref 13–44)
MCHC RBC-ENTMCNC: 24.6 PG — LOW (ref 27–34)
MCHC RBC-ENTMCNC: 30.1 % — LOW (ref 32–36)
MCV RBC AUTO: 81.8 FL — SIGNIFICANT CHANGE UP (ref 80–100)
MONOCYTES # BLD AUTO: 0.81 K/UL — SIGNIFICANT CHANGE UP (ref 0–0.9)
MONOCYTES NFR BLD AUTO: 9.3 % — SIGNIFICANT CHANGE UP (ref 2–14)
NEUTROPHILS # BLD AUTO: 5.45 K/UL — SIGNIFICANT CHANGE UP (ref 1.8–7.4)
NEUTROPHILS NFR BLD AUTO: 62.8 % — SIGNIFICANT CHANGE UP (ref 43–77)
NRBC # FLD: 0 K/UL — SIGNIFICANT CHANGE UP (ref 0–0)
PLATELET # BLD AUTO: 311 K/UL — SIGNIFICANT CHANGE UP (ref 150–400)
PMV BLD: 9.1 FL — SIGNIFICANT CHANGE UP (ref 7–13)
POTASSIUM SERPL-MCNC: 3.9 MMOL/L — SIGNIFICANT CHANGE UP (ref 3.5–5.3)
POTASSIUM SERPL-SCNC: 3.9 MMOL/L — SIGNIFICANT CHANGE UP (ref 3.5–5.3)
PROT SERPL-MCNC: 6.6 G/DL — SIGNIFICANT CHANGE UP (ref 6–8.3)
PROTHROM AB SERPL-ACNC: 11.8 SEC — SIGNIFICANT CHANGE UP (ref 9.8–13.1)
RBC # BLD: 4.02 M/UL — SIGNIFICANT CHANGE UP (ref 3.8–5.2)
RBC # FLD: 15.8 % — HIGH (ref 10.3–14.5)
SODIUM SERPL-SCNC: 136 MMOL/L — SIGNIFICANT CHANGE UP (ref 135–145)
TROPONIN T, HIGH SENSITIVITY: < 6 NG/L — SIGNIFICANT CHANGE UP (ref ?–14)
WBC # BLD: 8.68 K/UL — SIGNIFICANT CHANGE UP (ref 3.8–10.5)
WBC # FLD AUTO: 8.68 K/UL — SIGNIFICANT CHANGE UP (ref 3.8–10.5)

## 2019-12-23 PROCEDURE — 99284 EMERGENCY DEPT VISIT MOD MDM: CPT

## 2019-12-23 PROCEDURE — 71275 CT ANGIOGRAPHY CHEST: CPT | Mod: 26

## 2019-12-23 RX ORDER — IPRATROPIUM/ALBUTEROL SULFATE 18-103MCG
3 AEROSOL WITH ADAPTER (GRAM) INHALATION ONCE
Refills: 0 | Status: COMPLETED | OUTPATIENT
Start: 2019-12-23 | End: 2019-12-23

## 2019-12-23 RX ADMIN — Medication 3 MILLILITER(S): at 17:18

## 2019-12-23 NOTE — ED ADULT TRIAGE NOTE - CHIEF COMPLAINT QUOTE
pt coming with CP and palpitation and SOB started yest.  denies dizziness, pt took ASA this AM/ zoponex, pt on Levoflex sine last wk 12/13 for cough /SOB.

## 2019-12-23 NOTE — ED PROVIDER NOTE - OBJECTIVE STATEMENT
39F with hx of Asthma, Brain tumor s/p resection and  shunt p/w chest tightness and ambriz for the past week. patient states she saw her PMD who started her on steroids and Levaquin, steroids completed, still on Levaquin for another week. cxr preformed with PMD clear. sx have not improved. no cough or sick contacts. (+) recent travel, flew back from the [facility] this past Friday. no ocp use, calf swelling. states she had imaging of her calves with PMD and no blood clots noted.

## 2019-12-23 NOTE — ED PROVIDER NOTE - PROGRESS NOTE DETAILS
Luis Alfredo: CTA chest results d/w patient. enlarged thyroid with mild tracheal compression is not a new finding. 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

## 2019-12-23 NOTE — ED PROVIDER NOTE - PMH
Asthma    Liver lesion    Maternal care due to uterine scar from previous surgery    Multinodular goiter    No pertinent past medical history

## 2019-12-23 NOTE — ED ADULT NURSE NOTE - OBJECTIVE STATEMENT
38 y/o female presents to ED ambulatory, A&Ox4 c/o chest pain, SOB, diaphoresis, tingling to left arm since yesterday.  Pt states she took her mother's Lopressor 12.5mg due to HTN with no relief.  Lungs CTA.

## 2020-01-14 NOTE — PACU DISCHARGE NOTE - THE ANESTHESIA ORDERS USED IN THE PACU ORDER SET WILL BE DISCONTINUED UPON TRANSFER OF THIS PATIENT
Statement Selected

## 2020-04-26 ENCOUNTER — MESSAGE (OUTPATIENT)
Age: 40
End: 2020-04-26

## 2020-04-30 ENCOUNTER — APPOINTMENT (OUTPATIENT)
Dept: DISASTER EMERGENCY | Facility: CLINIC | Age: 40
End: 2020-04-30

## 2020-04-30 LAB
SARS-COV-2 IGG SERPL IA-ACNC: 5.3 INDEX
SARS-COV-2 IGG SERPL QL IA: POSITIVE

## 2020-05-07 ENCOUNTER — LABORATORY RESULT (OUTPATIENT)
Age: 40
End: 2020-05-07

## 2020-05-15 ENCOUNTER — TRANSCRIPTION ENCOUNTER (OUTPATIENT)
Age: 40
End: 2020-05-15

## 2020-08-18 ENCOUNTER — APPOINTMENT (OUTPATIENT)
Dept: INTERNAL MEDICINE | Facility: CLINIC | Age: 40
End: 2020-08-18

## 2020-08-18 DIAGNOSIS — Z87.440 PERSONAL HISTORY OF URINARY (TRACT) INFECTIONS: ICD-10-CM

## 2020-08-20 ENCOUNTER — TRANSCRIPTION ENCOUNTER (OUTPATIENT)
Age: 40
End: 2020-08-20

## 2020-08-20 LAB
APPEARANCE: CLEAR
BACTERIA UR CULT: NORMAL
BACTERIA: ABNORMAL
BILIRUBIN URINE: NEGATIVE
BLOOD URINE: ABNORMAL
COLOR: ABNORMAL
GLUCOSE QUALITATIVE U: NEGATIVE
HYALINE CASTS: 2 /LPF
KETONES URINE: NEGATIVE
LEUKOCYTE ESTERASE URINE: NEGATIVE
MICROSCOPIC-UA: NORMAL
NITRITE URINE: NEGATIVE
PH URINE: 6
PROTEIN URINE: NORMAL
RED BLOOD CELLS URINE: 174 /HPF
SPECIFIC GRAVITY URINE: 1.02
SQUAMOUS EPITHELIAL CELLS: 1 /HPF
UROBILINOGEN URINE: NORMAL
WHITE BLOOD CELLS URINE: 2 /HPF

## 2020-12-23 PROBLEM — Z87.440 HISTORY OF URINARY TRACT INFECTION: Status: RESOLVED | Noted: 2020-08-18 | Resolved: 2020-12-23

## 2021-08-03 ENCOUNTER — APPOINTMENT (OUTPATIENT)
Dept: INTERNAL MEDICINE | Facility: CLINIC | Age: 41
End: 2021-08-03
Payer: COMMERCIAL

## 2021-08-03 ENCOUNTER — NON-APPOINTMENT (OUTPATIENT)
Age: 41
End: 2021-08-03

## 2021-08-03 VITALS
OXYGEN SATURATION: 99 % | TEMPERATURE: 97.9 F | HEART RATE: 79 BPM | DIASTOLIC BLOOD PRESSURE: 77 MMHG | WEIGHT: 230 LBS | RESPIRATION RATE: 12 BRPM | HEIGHT: 61 IN | SYSTOLIC BLOOD PRESSURE: 159 MMHG | BODY MASS INDEX: 43.43 KG/M2

## 2021-08-03 VITALS — SYSTOLIC BLOOD PRESSURE: 130 MMHG | DIASTOLIC BLOOD PRESSURE: 75 MMHG

## 2021-08-03 VITALS — DIASTOLIC BLOOD PRESSURE: 75 MMHG | SYSTOLIC BLOOD PRESSURE: 130 MMHG

## 2021-08-03 DIAGNOSIS — R07.89 OTHER CHEST PAIN: ICD-10-CM

## 2021-08-03 DIAGNOSIS — K44.9 GASTRO-ESOPHAGEAL REFLUX DISEASE W/OUT ESOPHAGITIS: ICD-10-CM

## 2021-08-03 DIAGNOSIS — K21.9 GASTRO-ESOPHAGEAL REFLUX DISEASE W/OUT ESOPHAGITIS: ICD-10-CM

## 2021-08-03 DIAGNOSIS — Z86.018 PERSONAL HISTORY OF OTHER BENIGN NEOPLASM: ICD-10-CM

## 2021-08-03 DIAGNOSIS — Z83.3 FAMILY HISTORY OF DIABETES MELLITUS: ICD-10-CM

## 2021-08-03 DIAGNOSIS — Z00.00 ENCOUNTER FOR GENERAL ADULT MEDICAL EXAMINATION W/OUT ABNORMAL FINDINGS: ICD-10-CM

## 2021-08-03 PROCEDURE — 99385 PREV VISIT NEW AGE 18-39: CPT | Mod: 25

## 2021-08-03 PROCEDURE — 93000 ELECTROCARDIOGRAM COMPLETE: CPT

## 2021-08-03 NOTE — PHYSICAL EXAM
[No Acute Distress] : no acute distress [Well Nourished] : well nourished [Well Developed] : well developed [Well-Appearing] : well-appearing [Normal Sclera/Conjunctiva] : normal sclera/conjunctiva [PERRL] : pupils equal round and reactive to light [EOMI] : extraocular movements intact [Normal Outer Ear/Nose] : the outer ears and nose were normal in appearance [Normal Oropharynx] : the oropharynx was normal [Normal TMs] : both tympanic membranes were normal [No JVD] : no jugular venous distention [No Lymphadenopathy] : no lymphadenopathy [Supple] : supple [No Respiratory Distress] : no respiratory distress  [No Accessory Muscle Use] : no accessory muscle use [Clear to Auscultation] : lungs were clear to auscultation bilaterally [Normal Rate] : normal rate  [Regular Rhythm] : with a regular rhythm [Normal S1, S2] : normal S1 and S2 [No Murmur] : no murmur heard [No Carotid Bruits] : no carotid bruits [No Abdominal Bruit] : a ~M bruit was not heard ~T in the abdomen [No Varicosities] : no varicosities [Pedal Pulses Present] : the pedal pulses are present [No Edema] : there was no peripheral edema [No Palpable Aorta] : no palpable aorta [No Extremity Clubbing/Cyanosis] : no extremity clubbing/cyanosis [Declined Breast Exam] : declined breast exam  [Soft] : abdomen soft [Non Tender] : non-tender [Non-distended] : non-distended [No Masses] : no abdominal mass palpated [No HSM] : no HSM [Normal Bowel Sounds] : normal bowel sounds [Normal Posterior Cervical Nodes] : no posterior cervical lymphadenopathy [Normal Anterior Cervical Nodes] : no anterior cervical lymphadenopathy [No CVA Tenderness] : no CVA  tenderness [No Spinal Tenderness] : no spinal tenderness [No Joint Swelling] : no joint swelling [Grossly Normal Strength/Tone] : grossly normal strength/tone [No Rash] : no rash [No Skin Lesions] : no skin lesions [Coordination Grossly Intact] : coordination grossly intact [No Focal Deficits] : no focal deficits [Normal Gait] : normal gait [Normal Affect] : the affect was normal [Alert and Oriented x3] : oriented to person, place, and time [Normal Insight/Judgement] : insight and judgment were intact [de-identified] : Large nontender goiter right lobe greater than left lobe. [de-identified] : Recently done by her GYN

## 2021-08-03 NOTE — REASON FOR VISIT
Problem: Ventilation Defect:  Goal: Ability to achieve and maintain unassisted ventilation or tolerate decreased levels of ventilator support    Intervention: Support and monitor invasive and noninvasive mechanical ventilation  Adult Ventilation Update    Total Vent Days: 5    Patient Lines/Drains/Airways Status    Active Airway     Name: Placement date: Placement time: Site: Days:    Airway Group ET Tube Oral 7.5 08/07/18      Oral   4                Static Compliance (ml / cm H2O): 51 (08/11/18 1430)      Sputum/Suction   Cough: Productive (08/11/18 1004)  Sputum Amount: Small;Moderate (08/11/18 1004)  Sputum Color: White (08/11/18 1004)  Sputum Consistency: Thick (08/11/18 1004)    Mobility  Level of Mobility: Level I (08/11/18 0400)  Activity Performed: Unable to mobilize (08/11/18 0800)  Time Activity Tolerated: 10 min (08/10/18 0200)  Distance Per Occurrence (ft.): 0 feet (08/10/18 0200)  # of Times Distance was Traveled: 0 (08/10/18 0200)  Assistance / Tolerance: Assistance of One (08/10/18 1400)  Pt Calls for Assistance: No (08/11/18 1431)  Staff Present for Mobilization: RN (08/10/18 1400)  Gait: Unable to Ambulate (08/10/18 1800)  Reason Not Mobilized: Unstable condition (08/11/18 1200)  Mobilization Comments: not able to find safe sedation point. either too lethargic or restless.  (08/11/18 1200)    Events/Summary/Plan: decreased RR to 24 per MD order (08/11/18 1430)           [Annual Wellness Visit] : an annual wellness visit

## 2021-08-03 NOTE — COUNSELING
[Potential consequences of obesity discussed] : Potential consequences of obesity discussed [Benefits of weight loss discussed] : Benefits of weight loss discussed [Structured Weight Management Program suggested:] : Structured weight management program suggested [Encouraged to maintain food diary] : Encouraged to maintain food diary [Encouraged to increase physical activity] : Encouraged to increase physical activity [Encouraged to use exercise tracking device] : Encouraged to use exercise tracking device [Weigh Self Weekly] : weigh self weekly [Decrease Portions] : decrease portions [____ min/wk Activity] : [unfilled] min/wk activity [Keep Food Diary] : keep food diary [None] : None [Good understanding] : Patient has a good understanding of lifestyle changes and steps needed to achieve self management goal [FreeTextEntry1] : MARIA VICTORIA

## 2021-08-03 NOTE — REVIEW OF SYSTEMS
[Fatigue] : fatigue [Recent Change In Weight] : ~T recent weight change [Earache] : earache [Chest Pain] : chest pain [Abdominal Pain] : abdominal pain [Heartburn] : heartburn [Back Pain] : back pain [Negative] : Heme/Lymph [Fever] : no fever [Chills] : no chills [Hot Flashes] : no hot flashes [Night Sweats] : no night sweats [Nasal Discharge] : no nasal discharge [Sore Throat] : no sore throat [Palpitations] : no palpitations [Leg Claudication] : no leg claudication [Lower Ext Edema] : no lower extremity edema [Orthopnea] : no orthopnea [Paroxysmal Nocturnal Dyspnea] : no paroxysmal nocturnal dyspnea [Nausea] : no nausea [Constipation] : no constipation [Diarrhea] : diarrhea [Vomiting] : no vomiting [Melena] : no melena [Joint Pain] : no joint pain [Joint Stiffness] : no joint stiffness [Joint Swelling] : no joint swelling [Muscle Weakness] : no muscle weakness [Muscle Pain] : no muscle pain [FreeTextEntry2] : History of iron deficiency [FreeTextEntry4] : Right-sided congestion greater than left sided congestion [FreeTextEntry5] : NON exertional [FreeTextEntry6] : History of asthma [FreeTextEntry7] : Epigastric area [FreeTextEntry9] : Chronic

## 2021-08-04 DIAGNOSIS — D50.9 IRON DEFICIENCY ANEMIA, UNSPECIFIED: ICD-10-CM

## 2021-08-04 DIAGNOSIS — E78.6 LIPOPROTEIN DEFICIENCY: ICD-10-CM

## 2021-08-04 LAB
25(OH)D3 SERPL-MCNC: 21 NG/ML
ALBUMIN SERPL ELPH-MCNC: 4.5 G/DL
ALP BLD-CCNC: 99 U/L
ALT SERPL-CCNC: 14 U/L
ANION GAP SERPL CALC-SCNC: 10 MMOL/L
APPEARANCE: ABNORMAL
AST SERPL-CCNC: 20 U/L
BACTERIA: ABNORMAL
BILIRUB SERPL-MCNC: 0.4 MG/DL
BILIRUBIN URINE: NEGATIVE
BLOOD URINE: NEGATIVE
BUN SERPL-MCNC: 12 MG/DL
C TRACH RRNA SPEC QL NAA+PROBE: NOT DETECTED
CALCIUM SERPL-MCNC: 10.4 MG/DL
CHLORIDE SERPL-SCNC: 104 MMOL/L
CHOLEST SERPL-MCNC: 140 MG/DL
CO2 SERPL-SCNC: 25 MMOL/L
COLOR: YELLOW
COVID-19 NUCLEOCAPSID  GAM ANTIBODY INTERPRETATION: POSITIVE
CREAT SERPL-MCNC: 0.71 MG/DL
CREAT SPEC-SCNC: 307 MG/DL
ESTIMATED AVERAGE GLUCOSE: 111 MG/DL
FERRITIN SERPL-MCNC: 8 NG/ML
GLUCOSE QUALITATIVE U: NEGATIVE
GLUCOSE SERPL-MCNC: 89 MG/DL
GLUCOSE SERPL-MCNC: 94 MG/DL
HBA1C MFR BLD HPLC: 5.5 %
HBV SURFACE AB SER QL: REACTIVE
HDLC SERPL-MCNC: 43 MG/DL
HIV1+2 AB SPEC QL IA.RAPID: NONREACTIVE
HSV 1+2 IGG SER IA-IMP: NEGATIVE
HSV 1+2 IGG SER IA-IMP: POSITIVE
HSV1 IGG SER QL: >62.2 INDEX
HSV2 IGG SER QL: 0.09 INDEX
HYALINE CASTS: 0 /LPF
IRON SERPL-MCNC: 38 UG/DL
KETONES URINE: NEGATIVE
LDLC SERPL CALC-MCNC: 83 MG/DL
LEUKOCYTE ESTERASE URINE: NEGATIVE
MEV IGG FLD QL IA: 58.5 AU/ML
MEV IGG+IGM SER-IMP: POSITIVE
MICROALBUMIN 24H UR DL<=1MG/L-MCNC: 5.6 MG/DL
MICROALBUMIN/CREAT 24H UR-RTO: 18 MG/G
MICROSCOPIC-UA: NORMAL
MUV AB SER-ACNC: POSITIVE
MUV IGG SER QL IA: 28 AU/ML
N GONORRHOEA RRNA SPEC QL NAA+PROBE: NOT DETECTED
NITRITE URINE: NEGATIVE
NONHDLC SERPL-MCNC: 97 MG/DL
PH URINE: 6
POTASSIUM SERPL-SCNC: 3.8 MMOL/L
PROT SERPL-MCNC: 7.7 G/DL
PROTEIN URINE: ABNORMAL
RED BLOOD CELLS URINE: 2 /HPF
RUBV IGG FLD-ACNC: 1.5 INDEX
RUBV IGG SER-IMP: POSITIVE
SARS-COV-2 AB SERPL QL IA: 11.7 INDEX
SODIUM SERPL-SCNC: 139 MMOL/L
SOURCE AMPLIFICATION: NORMAL
SPECIFIC GRAVITY URINE: 1.03
SQUAMOUS EPITHELIAL CELLS: >27 /HPF
T PALLIDUM AB SER QL IA: NEGATIVE
T4 FREE SERPL-MCNC: 0.9 NG/DL
THYROGLOB AB SERPL-ACNC: <20 IU/ML
THYROPEROXIDASE AB SERPL IA-ACNC: <10 IU/ML
TRIGL SERPL-MCNC: 66 MG/DL
TSH SERPL-ACNC: 0.69 UIU/ML
UROBILINOGEN URINE: ABNORMAL
VIT B12 SERPL-MCNC: 553 PG/ML
VZV AB TITR SER: POSITIVE
VZV IGG SER IF-ACNC: 1213 INDEX
WHITE BLOOD CELLS URINE: 5 /HPF

## 2021-08-05 LAB
BASOPHILS # BLD AUTO: 0.02 K/UL
BASOPHILS NFR BLD AUTO: 0.4 %
EOSINOPHIL # BLD AUTO: 0.08 K/UL
EOSINOPHIL NFR BLD AUTO: 1.8 %
HCT VFR BLD CALC: 37.9 %
HGB BLD-MCNC: 11.1 G/DL
IMM GRANULOCYTES NFR BLD AUTO: 0 %
LYMPHOCYTES # BLD AUTO: 1.73 K/UL
LYMPHOCYTES NFR BLD AUTO: 38.4 %
MAN DIFF?: NORMAL
MCHC RBC-ENTMCNC: 25.1 PG
MCHC RBC-ENTMCNC: 29.3 GM/DL
MCV RBC AUTO: 85.6 FL
MONOCYTES # BLD AUTO: 0.32 K/UL
MONOCYTES NFR BLD AUTO: 7.1 %
NEUTROPHILS # BLD AUTO: 2.36 K/UL
NEUTROPHILS NFR BLD AUTO: 52.3 %
PLATELET # BLD AUTO: 321 K/UL
RBC # BLD: 4.43 M/UL
RBC # FLD: 16.3 %
WBC # FLD AUTO: 4.51 K/UL

## 2021-08-08 LAB
HSV1 IGM SER QL: NEGATIVE
HSV2 AB FLD-ACNC: NEGATIVE

## 2021-08-11 DIAGNOSIS — R31.29 OTHER MICROSCOPIC HEMATURIA: ICD-10-CM

## 2021-08-13 LAB
APPEARANCE: CLEAR
BACTERIA UR CULT: NORMAL
BACTERIA: ABNORMAL
BILIRUBIN URINE: NEGATIVE
BLOOD URINE: NEGATIVE
COLOR: YELLOW
GLUCOSE QUALITATIVE U: NEGATIVE
HYALINE CASTS: 4 /LPF
KETONES URINE: NEGATIVE
LEUKOCYTE ESTERASE URINE: NEGATIVE
MICROSCOPIC-UA: NORMAL
NITRITE URINE: NEGATIVE
PH URINE: 8
PROTEIN URINE: NORMAL
RED BLOOD CELLS URINE: 7 /HPF
SPECIFIC GRAVITY URINE: 1.02
SQUAMOUS EPITHELIAL CELLS: 5 /HPF
UROBILINOGEN URINE: NORMAL
WHITE BLOOD CELLS URINE: 3 /HPF

## 2021-08-25 ENCOUNTER — APPOINTMENT (OUTPATIENT)
Dept: PULMONOLOGY | Facility: CLINIC | Age: 41
End: 2021-08-25
Payer: COMMERCIAL

## 2021-08-25 VITALS
SYSTOLIC BLOOD PRESSURE: 100 MMHG | RESPIRATION RATE: 12 BRPM | OXYGEN SATURATION: 98 % | TEMPERATURE: 97.3 F | DIASTOLIC BLOOD PRESSURE: 64 MMHG | HEART RATE: 86 BPM | HEIGHT: 61 IN

## 2021-08-25 DIAGNOSIS — E66.01 MORBID (SEVERE) OBESITY DUE TO EXCESS CALORIES: ICD-10-CM

## 2021-08-25 DIAGNOSIS — E04.2 NONTOXIC MULTINODULAR GOITER: ICD-10-CM

## 2021-08-25 DIAGNOSIS — Z80.0 FAMILY HISTORY OF MALIGNANT NEOPLASM OF DIGESTIVE ORGANS: ICD-10-CM

## 2021-08-25 DIAGNOSIS — Z78.9 OTHER SPECIFIED HEALTH STATUS: ICD-10-CM

## 2021-08-25 DIAGNOSIS — R06.83 SNORING: ICD-10-CM

## 2021-08-25 DIAGNOSIS — Z86.018 PERSONAL HISTORY OF OTHER BENIGN NEOPLASM: ICD-10-CM

## 2021-08-25 PROCEDURE — 94729 DIFFUSING CAPACITY: CPT

## 2021-08-25 PROCEDURE — 99213 OFFICE O/P EST LOW 20 MIN: CPT | Mod: 25

## 2021-08-25 PROCEDURE — 94060 EVALUATION OF WHEEZING: CPT

## 2021-08-25 PROCEDURE — 94726 PLETHYSMOGRAPHY LUNG VOLUMES: CPT

## 2021-08-26 PROBLEM — Z80.0 FAMILY HISTORY OF COLORECTAL CANCER: Status: ACTIVE | Noted: 2021-08-03

## 2021-08-26 PROBLEM — E04.2 MULTINODULAR GOITER: Status: ACTIVE | Noted: 2021-08-03

## 2021-08-26 PROBLEM — Z86.018 HISTORY OF UTERINE LEIOMYOMA: Status: RESOLVED | Noted: 2021-08-03 | Resolved: 2021-08-26

## 2021-08-26 PROBLEM — Z78.9 SOCIAL ALCOHOL USE: Status: ACTIVE | Noted: 2021-08-26

## 2021-08-26 PROBLEM — E66.01 MORBID OBESITY WITH BMI OF 40.0-44.9, ADULT: Status: ACTIVE | Noted: 2021-08-03

## 2021-08-26 NOTE — HISTORY OF PRESENT ILLNESS
[Never] : never [TextBox_4] : Patient is a 40-year-old obese female with past medical history significant for asthma, multinodular goiter who presents for pulmonary consult.  Patient complains of cough shortness of breath and dyspnea on exertion.  She also complains of nonrestorative sleep and morning headaches.  She currently denies fevers chills chest pain

## 2021-08-26 NOTE — PHYSICAL EXAM
[No Acute Distress] : no acute distress [Normal Oropharynx] : normal oropharynx [III] : Mallampati Class: III [Normal Rate/Rhythm] : normal rate/rhythm [Normal S1, S2] : normal s1, s2 [No Resp Distress] : no resp distress [Clear to Auscultation Bilaterally] : clear to auscultation bilaterally [No Abnormalities] : no abnormalities [Benign] : benign [Normal Gait] : normal gait [No Clubbing] : no clubbing [No Cyanosis] : no cyanosis [No Edema] : no edema [FROM] : FROM [Normal Color/ Pigmentation] : normal color/ pigmentation [No Focal Deficits] : no focal deficits [Oriented x3] : oriented x3 [Normal Affect] : normal affect [TextBox_44] : Nodular goiter

## 2021-08-26 NOTE — ASSESSMENT
Have Your Skin Lesions Been Treated?: not been treated
[FreeTextEntry1] : In summary the patient is a 40-year-old obese female with past medical history significant for goiter, asthma who presents for pulmonary consult.  The patient's history and physical is actually consistent with asthma and obstructive sleep apnea.  The patient underwent a pulmonary function test which revealed a questionable flattening of the inspiratory loop.  The patient is now started on a long-acting beta agonist and inhaled corticosteroid.\par \par A home sleep study has been ordered and the patient is instructed to follow-up in 1 month
Is This A New Presentation, Or A Follow-Up?: Skin Lesions
Additional History: Patient denies anything new or changing since last appointment,

## 2021-08-29 ENCOUNTER — APPOINTMENT (OUTPATIENT)
Dept: DISASTER EMERGENCY | Facility: CLINIC | Age: 41
End: 2021-08-29

## 2021-08-29 DIAGNOSIS — Z01.818 ENCOUNTER FOR OTHER PREPROCEDURAL EXAMINATION: ICD-10-CM

## 2021-08-30 LAB — SARS-COV-2 N GENE NPH QL NAA+PROBE: NOT DETECTED

## 2021-09-01 ENCOUNTER — RX RENEWAL (OUTPATIENT)
Age: 41
End: 2021-09-01

## 2021-10-06 ENCOUNTER — APPOINTMENT (OUTPATIENT)
Dept: CARDIOLOGY | Facility: CLINIC | Age: 41
End: 2021-10-06
Payer: COMMERCIAL

## 2021-10-06 PROCEDURE — 93306 TTE W/DOPPLER COMPLETE: CPT

## 2021-10-29 ENCOUNTER — RX RENEWAL (OUTPATIENT)
Age: 41
End: 2021-10-29

## 2022-10-24 NOTE — DISCHARGE NOTE OB - FUNCTIONAL SCREEN CURRENT LEVEL: DRESSING, MLM
[Fully active, able to carry on all pre-disease performance without restriction] : Status 0 - Fully active, able to carry on all pre-disease performance without restriction [Normal] : affect appropriate (0) independent

## 2023-02-17 ENCOUNTER — APPOINTMENT (OUTPATIENT)
Dept: DERMATOLOGY | Facility: CLINIC | Age: 43
End: 2023-02-17
Payer: COMMERCIAL

## 2023-02-17 DIAGNOSIS — L98.8 OTHER SPECIFIED DISORDERS OF THE SKIN AND SUBCUTANEOUS TISSUE: ICD-10-CM

## 2023-02-17 PROCEDURE — 99204 OFFICE O/P NEW MOD 45 MIN: CPT

## 2023-07-19 NOTE — BRIEF OPERATIVE NOTE - SPECIMENS
I have reviewed discharge instructions with the patient at this time. The Patient verbalized understanding and denies any further questions. Patient has been made aware of prescription(s) called into pharmacy for . Patient ambulatory out to waiting room at this time.         Edyta Mercer  07/19/23 1914 foreign body

## 2023-10-25 ENCOUNTER — APPOINTMENT (OUTPATIENT)
Dept: ENDOCRINOLOGY | Facility: CLINIC | Age: 43
End: 2023-10-25
Payer: COMMERCIAL

## 2023-10-25 VITALS
WEIGHT: 220 LBS | OXYGEN SATURATION: 100 % | DIASTOLIC BLOOD PRESSURE: 86 MMHG | HEIGHT: 62 IN | BODY MASS INDEX: 40.48 KG/M2 | TEMPERATURE: 97.8 F | SYSTOLIC BLOOD PRESSURE: 127 MMHG | HEART RATE: 74 BPM

## 2023-10-25 DIAGNOSIS — E89.0 POSTPROCEDURAL HYPOTHYROIDISM: ICD-10-CM

## 2023-10-25 PROCEDURE — 99214 OFFICE O/P EST MOD 30 MIN: CPT | Mod: 25

## 2023-10-25 PROCEDURE — 36415 COLL VENOUS BLD VENIPUNCTURE: CPT

## 2023-10-25 RX ORDER — BETAMETHASONE DIPROPIONATE 0.5 MG/G
0.05 OINTMENT, AUGMENTED TOPICAL
Qty: 1 | Refills: 0 | Status: COMPLETED | COMMUNITY
Start: 2023-02-17 | End: 2023-10-25

## 2023-10-25 RX ORDER — CIPROFLOXACIN HYDROCHLORIDE 500 MG/1
500 TABLET, FILM COATED ORAL TWICE DAILY
Qty: 14 | Refills: 0 | Status: COMPLETED | COMMUNITY
Start: 2021-08-11 | End: 2023-10-25

## 2023-10-26 ENCOUNTER — LABORATORY RESULT (OUTPATIENT)
Age: 43
End: 2023-10-26

## 2023-10-30 ENCOUNTER — LABORATORY RESULT (OUTPATIENT)
Age: 43
End: 2023-10-30

## 2023-10-31 PROBLEM — E89.0 POST-SURGICAL HYPOTHYROIDISM: Status: ACTIVE | Noted: 2023-10-31

## 2023-11-01 ENCOUNTER — NON-APPOINTMENT (OUTPATIENT)
Age: 43
End: 2023-11-01

## 2023-11-01 DIAGNOSIS — R82.994 HYPERCALCIURIA: ICD-10-CM

## 2023-11-01 LAB
24R-OH-CALCIDIOL SERPL-MCNC: 119 PG/ML
25(OH)D3 SERPL-MCNC: 25.4 NG/ML
ACE BLD-CCNC: 46 U/L
ALBUMIN SERPL ELPH-MCNC: 4.5 G/DL
ALP BLD-CCNC: 121 U/L
ALT SERPL-CCNC: 14 U/L
ANION GAP SERPL CALC-SCNC: 10 MMOL/L
AST SERPL-CCNC: 14 U/L
BILIRUB SERPL-MCNC: 0.4 MG/DL
BUN SERPL-MCNC: 11 MG/DL
CALCIUM SERPL-MCNC: 11.4 MG/DL
CALCIUM SERPL-MCNC: 11.4 MG/DL
CAU: 63 MG/DL
CHLORIDE SERPL-SCNC: 106 MMOL/L
CO2 SERPL-SCNC: 24 MMOL/L
CREAT 24H UR-MCNC: 1.6 G/24 H
CREAT ?TM UR-MCNC: 219 MG/DL
CREAT SERPL-MCNC: 0.69 MG/DL
EGFR: 112 ML/MIN/1.73M2
ESTIMATED AVERAGE GLUCOSE: 103 MG/DL
GLUCOSE SERPL-MCNC: 84 MG/DL
HBA1C MFR BLD HPLC: 5.2 %
HCT VFR BLD CALC: 40.6 %
HGB BLD-MCNC: 13 G/DL
IGA 24H UR QL IFE: NORMAL
M PROTEIN SPEC IFE-MCNC: NORMAL
MCHC RBC-ENTMCNC: 29.6 PG
MCHC RBC-ENTMCNC: 32 GM/DL
MCV RBC AUTO: 92.5 FL
PARATHYROID HORMONE INTACT: 96 PG/ML
PLATELET # BLD AUTO: 283 K/UL
POTASSIUM SERPL-SCNC: 4.6 MMOL/L
PROT ?TM UR-MCNC: 24 HR
PROT SERPL-MCNC: 7.1 G/DL
RBC # BLD: 4.39 M/UL
RBC # FLD: 12.9 %
SODIUM SERPL-SCNC: 139 MMOL/L
SPECIMEN VOL 24H UR: 472 MG/24 H
SPECIMEN VOL 24H UR: 750 ML
T4 FREE SERPL-MCNC: 1.1 NG/DL
THYROGLOB AB SERPL-ACNC: <1 IU/ML
THYROPEROXIDASE AB SERPL IA-ACNC: <9 IU/ML
TSH SERPL-ACNC: 1.31 UIU/ML
WBC # FLD AUTO: 4.19 K/UL

## 2023-11-06 LAB — PTH RELATED PROT SERPL-MCNC: <2 PMOL/L

## 2023-11-09 ENCOUNTER — NON-APPOINTMENT (OUTPATIENT)
Age: 43
End: 2023-11-09

## 2023-11-17 ENCOUNTER — APPOINTMENT (OUTPATIENT)
Dept: ULTRASOUND IMAGING | Facility: IMAGING CENTER | Age: 43
End: 2023-11-17
Payer: COMMERCIAL

## 2023-11-17 ENCOUNTER — OUTPATIENT (OUTPATIENT)
Dept: OUTPATIENT SERVICES | Facility: HOSPITAL | Age: 43
LOS: 1 days | End: 2023-11-17
Payer: COMMERCIAL

## 2023-11-17 DIAGNOSIS — Z98.890 OTHER SPECIFIED POSTPROCEDURAL STATES: Chronic | ICD-10-CM

## 2023-11-17 DIAGNOSIS — Z98.891 HISTORY OF UTERINE SCAR FROM PREVIOUS SURGERY: Chronic | ICD-10-CM

## 2023-11-17 DIAGNOSIS — Z98.89 OTHER SPECIFIED POSTPROCEDURAL STATES: Chronic | ICD-10-CM

## 2023-11-17 DIAGNOSIS — E04.2 NONTOXIC MULTINODULAR GOITER: ICD-10-CM

## 2023-11-17 DIAGNOSIS — Z90.89 ACQUIRED ABSENCE OF OTHER ORGANS: Chronic | ICD-10-CM

## 2023-11-17 PROCEDURE — 76536 US EXAM OF HEAD AND NECK: CPT

## 2023-11-17 PROCEDURE — 76536 US EXAM OF HEAD AND NECK: CPT | Mod: 26

## 2023-11-22 NOTE — PACU DISCHARGE NOTE - PAIN:
----- Message from Carmel Cassidy sent at 11/22/2023 10:11 AM CST -----  Contact: Self  Makayla Garcia  MRN: [id number]  Home Phone      [phone]  Work Phone      Not on file.  Mobile          [phone]    Patient Care Team:  Quita Donnelly MD as PCP - General (Family Medicine)  New Love MD as Obstetrician (Obstetrics)  Magali Fitch LPN as Care Coordinator  Halina Chaudhry LPN as Care Coordinator  OB? No  What phone number can you be reached at? [phone]  Message: Pt went to ER on Sunday night for a ruptured cyst on her ovary. Was told to f/u. Pt scheduled appt online for 12/20, but is wanting to know if she should come in sooner        
Pt scheduled for 11/27/23.  
Controlled with current regime

## 2023-12-05 ENCOUNTER — NON-APPOINTMENT (OUTPATIENT)
Age: 43
End: 2023-12-05

## 2023-12-14 ENCOUNTER — LABORATORY RESULT (OUTPATIENT)
Age: 43
End: 2023-12-14

## 2023-12-14 ENCOUNTER — NON-APPOINTMENT (OUTPATIENT)
Age: 43
End: 2023-12-14

## 2023-12-14 ENCOUNTER — APPOINTMENT (OUTPATIENT)
Dept: INTERNAL MEDICINE | Facility: CLINIC | Age: 43
End: 2023-12-14
Payer: COMMERCIAL

## 2023-12-14 VITALS
BODY MASS INDEX: 38.83 KG/M2 | SYSTOLIC BLOOD PRESSURE: 144 MMHG | HEIGHT: 62 IN | DIASTOLIC BLOOD PRESSURE: 78 MMHG | HEART RATE: 92 BPM | WEIGHT: 211 LBS | OXYGEN SATURATION: 100 %

## 2023-12-14 VITALS — DIASTOLIC BLOOD PRESSURE: 86 MMHG | SYSTOLIC BLOOD PRESSURE: 138 MMHG

## 2023-12-14 DIAGNOSIS — N30.01 ACUTE CYSTITIS WITH HEMATURIA: ICD-10-CM

## 2023-12-14 DIAGNOSIS — R79.89 OTHER SPECIFIED ABNORMAL FINDINGS OF BLOOD CHEMISTRY: ICD-10-CM

## 2023-12-14 DIAGNOSIS — R82.90 UNSPECIFIED ABNORMAL FINDINGS IN URINE: ICD-10-CM

## 2023-12-14 DIAGNOSIS — Z00.00 ENCOUNTER FOR GENERAL ADULT MEDICAL EXAMINATION W/OUT ABNORMAL FINDINGS: ICD-10-CM

## 2023-12-14 DIAGNOSIS — R22.2 LOCALIZED SWELLING, MASS AND LUMP, TRUNK: ICD-10-CM

## 2023-12-14 DIAGNOSIS — J45.30 MILD PERSISTENT ASTHMA, UNCOMPLICATED: ICD-10-CM

## 2023-12-14 PROCEDURE — 36415 COLL VENOUS BLD VENIPUNCTURE: CPT

## 2023-12-14 PROCEDURE — 99386 PREV VISIT NEW AGE 40-64: CPT | Mod: 25

## 2023-12-14 PROCEDURE — 99213 OFFICE O/P EST LOW 20 MIN: CPT | Mod: 25

## 2023-12-14 RX ORDER — FLUTICASONE FUROATE AND VILANTEROL TRIFENATATE 100; 25 UG/1; UG/1
100-25 POWDER RESPIRATORY (INHALATION)
Qty: 1 | Refills: 3 | Status: ACTIVE | COMMUNITY
Start: 2023-12-14

## 2023-12-14 RX ORDER — SEMAGLUTIDE 2.4 MG/.75ML
2.4 INJECTION, SOLUTION SUBCUTANEOUS
Qty: 3 | Refills: 2 | Status: ACTIVE | COMMUNITY
Start: 2023-12-14 | End: 2024-09-09

## 2023-12-14 RX ORDER — CHOLECALCIFEROL (VITAMIN D3) 1250 MCG
1.25 MG CAPSULE ORAL
Qty: 4 | Refills: 1 | Status: DISCONTINUED | COMMUNITY
Start: 2021-08-04 | End: 2023-12-14

## 2023-12-14 RX ORDER — METHYLDOPA 500 MG
160 (50 FE) TABLET ORAL
Qty: 90 | Refills: 5 | Status: DISCONTINUED | COMMUNITY
Start: 2021-08-04 | End: 2023-12-14

## 2023-12-14 RX ORDER — LIRAGLUTIDE 6 MG/ML
18 INJECTION SUBCUTANEOUS
Qty: 1 | Refills: 2 | Status: DISCONTINUED | COMMUNITY
Start: 2023-11-09 | End: 2023-12-14

## 2023-12-16 NOTE — PHYSICAL EXAM
[Normal] : affect was normal and insight and judgment were intact [de-identified] : Left upper quadrant lump-firm non mobile

## 2023-12-16 NOTE — ASSESSMENT
[FreeTextEntry1] : Blood work done today Has had mammogram UTD with influenza check parathyroid and calcium abdominal wall lump-non reducible, U/S ordered for further evaluation Asthma is well controlled

## 2023-12-16 NOTE — HEALTH RISK ASSESSMENT
[Good] : ~his/her~  mood as  good [FreeTextEntry1] : health maintenance [No] : No [0] : 2) Feeling down, depressed, or hopeless: Not at all (0) [PHQ-2 Negative - No further assessment needed] : PHQ-2 Negative - No further assessment needed [de-identified] : endocrinology [[id number]] : 0 [Never] : Never

## 2023-12-16 NOTE — HISTORY OF PRESENT ILLNESS
[FreeTextEntry1] : Patient presents for annual physical [de-identified] : Has history of thyroidectomy-following with surgery endocrinology-found to have hyperparathyrodism-monitored with endocrinology On GLP-1 agonist for weight loss Did notice abdominal wall lump-non tender has been occurring with weight loss Has not grown in size Denies any abdominal pain, nausea vomiting change in bowel habits

## 2023-12-20 LAB
25(OH)D3 SERPL-MCNC: 24 NG/ML
ALBUMIN SERPL ELPH-MCNC: 4.6 G/DL
ALP BLD-CCNC: 141 U/L
ALT SERPL-CCNC: 10 U/L
ANION GAP SERPL CALC-SCNC: 17 MMOL/L
APPEARANCE: ABNORMAL
AST SERPL-CCNC: 14 U/L
BASOPHILS # BLD AUTO: 0.02 K/UL
BASOPHILS NFR BLD AUTO: 0.4 %
BILIRUB SERPL-MCNC: 0.3 MG/DL
BILIRUBIN URINE: NEGATIVE
BLOOD URINE: NEGATIVE
BUN SERPL-MCNC: 12 MG/DL
C TRACH RRNA SPEC QL NAA+PROBE: NOT DETECTED
CALCIUM SERPL-MCNC: 11.7 MG/DL
CALCIUM SERPL-MCNC: 11.7 MG/DL
CHLORIDE SERPL-SCNC: 108 MMOL/L
CHOLEST SERPL-MCNC: 146 MG/DL
CO2 SERPL-SCNC: 18 MMOL/L
COLOR: YELLOW
CORTIS SERPL-MCNC: 8 UG/DL
CREAT SERPL-MCNC: 0.79 MG/DL
EGFR: 96 ML/MIN/1.73M2
EOSINOPHIL # BLD AUTO: 0.05 K/UL
EOSINOPHIL NFR BLD AUTO: 0.9 %
ESTIMATED AVERAGE GLUCOSE: 103 MG/DL
FOLATE SERPL-MCNC: 5 NG/ML
GLUCOSE QUALITATIVE U: NEGATIVE MG/DL
GLUCOSE SERPL-MCNC: 95 MG/DL
HBA1C MFR BLD HPLC: 5.2 %
HCT VFR BLD CALC: 41.1 %
HDLC SERPL-MCNC: 40 MG/DL
HGB BLD-MCNC: 13.5 G/DL
HIV1+2 AB SPEC QL IA.RAPID: NONREACTIVE
IMM GRANULOCYTES NFR BLD AUTO: 0.4 %
KETONES URINE: ABNORMAL MG/DL
LDLC SERPL CALC-MCNC: 88 MG/DL
LEUKOCYTE ESTERASE URINE: NEGATIVE
LYMPHOCYTES # BLD AUTO: 1.9 K/UL
LYMPHOCYTES NFR BLD AUTO: 33.9 %
MAN DIFF?: NORMAL
MCHC RBC-ENTMCNC: 29.9 PG
MCHC RBC-ENTMCNC: 32.8 GM/DL
MCV RBC AUTO: 91.1 FL
MONOCYTES # BLD AUTO: 0.44 K/UL
MONOCYTES NFR BLD AUTO: 7.9 %
N GONORRHOEA RRNA SPEC QL NAA+PROBE: NOT DETECTED
NEUTROPHILS # BLD AUTO: 3.17 K/UL
NEUTROPHILS NFR BLD AUTO: 56.5 %
NITRITE URINE: NEGATIVE
NONHDLC SERPL-MCNC: 107 MG/DL
PARATHYROID HORMONE INTACT: 83 PG/ML
PH URINE: 6
PLATELET # BLD AUTO: 319 K/UL
POTASSIUM SERPL-SCNC: 4.3 MMOL/L
PROT SERPL-MCNC: 7.4 G/DL
PROTEIN URINE: NORMAL MG/DL
RBC # BLD: 4.51 M/UL
RBC # FLD: 12.8 %
SODIUM SERPL-SCNC: 142 MMOL/L
SOURCE AMPLIFICATION: NORMAL
SPECIFIC GRAVITY URINE: 1.03
T PALLIDUM AB SER QL IA: NEGATIVE
TRIGL SERPL-MCNC: 100 MG/DL
TSH SERPL-ACNC: 4.32 UIU/ML
UROBILINOGEN URINE: 1 MG/DL
VIT B12 SERPL-MCNC: >2000 PG/ML
WBC # FLD AUTO: 5.6 K/UL

## 2023-12-23 DIAGNOSIS — B00.1 HERPESVIRAL VESICULAR DERMATITIS: ICD-10-CM

## 2023-12-23 RX ORDER — VALACYCLOVIR 500 MG/1
500 TABLET, FILM COATED ORAL TWICE DAILY
Qty: 14 | Refills: 3 | Status: ACTIVE | COMMUNITY
Start: 2023-12-23 | End: 1900-01-01

## 2023-12-28 ENCOUNTER — OUTPATIENT (OUTPATIENT)
Dept: OUTPATIENT SERVICES | Facility: HOSPITAL | Age: 43
LOS: 1 days | End: 2023-12-28
Payer: COMMERCIAL

## 2023-12-28 ENCOUNTER — APPOINTMENT (OUTPATIENT)
Dept: ULTRASOUND IMAGING | Facility: IMAGING CENTER | Age: 43
End: 2023-12-28
Payer: COMMERCIAL

## 2023-12-28 DIAGNOSIS — Z98.89 OTHER SPECIFIED POSTPROCEDURAL STATES: Chronic | ICD-10-CM

## 2023-12-28 DIAGNOSIS — Z90.89 ACQUIRED ABSENCE OF OTHER ORGANS: Chronic | ICD-10-CM

## 2023-12-28 DIAGNOSIS — Z98.890 OTHER SPECIFIED POSTPROCEDURAL STATES: Chronic | ICD-10-CM

## 2023-12-28 DIAGNOSIS — Z00.8 ENCOUNTER FOR OTHER GENERAL EXAMINATION: ICD-10-CM

## 2023-12-28 DIAGNOSIS — Z98.891 HISTORY OF UTERINE SCAR FROM PREVIOUS SURGERY: Chronic | ICD-10-CM

## 2023-12-28 LAB
GGT SERPL-CCNC: 23 U/L
INFLUENZA A RESULT: NOT DETECTED
INFLUENZA B RESULT: NOT DETECTED
RESP SYN VIRUS RESULT: NOT DETECTED
SARS-COV-2 RESULT: NOT DETECTED

## 2023-12-28 PROCEDURE — 76705 ECHO EXAM OF ABDOMEN: CPT | Mod: 26

## 2023-12-28 PROCEDURE — 76705 ECHO EXAM OF ABDOMEN: CPT

## 2024-01-03 ENCOUNTER — TRANSCRIPTION ENCOUNTER (OUTPATIENT)
Age: 44
End: 2024-01-03

## 2024-01-03 LAB — ALP BONE SERPL-MCNC: 25.7 UG/L

## 2024-01-07 ENCOUNTER — TRANSCRIPTION ENCOUNTER (OUTPATIENT)
Age: 44
End: 2024-01-07

## 2024-01-09 ENCOUNTER — TRANSCRIPTION ENCOUNTER (OUTPATIENT)
Age: 44
End: 2024-01-09

## 2024-01-16 ENCOUNTER — TRANSCRIPTION ENCOUNTER (OUTPATIENT)
Age: 44
End: 2024-01-16

## 2024-03-11 ENCOUNTER — TRANSCRIPTION ENCOUNTER (OUTPATIENT)
Age: 44
End: 2024-03-11

## 2024-04-03 DIAGNOSIS — B37.31 ACUTE CANDIDIASIS OF VULVA AND VAGINA: ICD-10-CM

## 2024-04-03 RX ORDER — LORATADINE 10 MG/1
10 TABLET ORAL
Qty: 90 | Refills: 0 | Status: ACTIVE | COMMUNITY
Start: 2024-04-03 | End: 1900-01-01

## 2024-04-03 RX ORDER — FAMOTIDINE 20 MG/1
20 TABLET, FILM COATED ORAL
Qty: 1 | Refills: 3 | Status: ACTIVE | COMMUNITY
Start: 2021-08-03 | End: 1900-01-01

## 2024-04-03 RX ORDER — FLUCONAZOLE 100 MG/1
100 TABLET ORAL
Qty: 90 | Refills: 0 | Status: ACTIVE | COMMUNITY
Start: 2024-04-03 | End: 1900-01-01

## 2024-04-11 LAB
T4 FREE SERPL-MCNC: 1 NG/DL
TSH SERPL-ACNC: 1.36 UIU/ML

## 2024-05-14 ENCOUNTER — APPOINTMENT (OUTPATIENT)
Dept: ENDOCRINOLOGY | Facility: CLINIC | Age: 44
End: 2024-05-14
Payer: COMMERCIAL

## 2024-05-14 DIAGNOSIS — E55.9 VITAMIN D DEFICIENCY, UNSPECIFIED: ICD-10-CM

## 2024-05-14 DIAGNOSIS — D35.1 BENIGN NEOPLASM OF PARATHYROID GLAND: ICD-10-CM

## 2024-05-14 DIAGNOSIS — R73.03 PREDIABETES.: ICD-10-CM

## 2024-05-14 DIAGNOSIS — E83.52 HYPERCALCEMIA: ICD-10-CM

## 2024-05-14 PROCEDURE — 99442: CPT

## 2024-05-14 NOTE — REASON FOR VISIT
[Home] : at home, [unfilled] , at the time of the visit. [Medical Office: ([facility])___] : at the medical office located in  [Verbal consent obtained from patient] : the patient, [unfilled] [Initial Evaluation] : an initial evaluation [Hypothyroidism] : hypothyroidism

## 2024-05-16 LAB
25(OH)D3 SERPL-MCNC: 17.3 NG/ML
ALBUMIN SERPL ELPH-MCNC: 4.3 G/DL
ALP BLD-CCNC: 106 U/L
ALT SERPL-CCNC: 9 U/L
ANION GAP SERPL CALC-SCNC: 11 MMOL/L
AST SERPL-CCNC: 14 U/L
BASOPHILS # BLD AUTO: 0.01 K/UL
BASOPHILS NFR BLD AUTO: 0.2 %
BILIRUB SERPL-MCNC: 0.3 MG/DL
BUN SERPL-MCNC: 12 MG/DL
CALCIUM SERPL-MCNC: 9.1 MG/DL
CALCIUM SERPL-MCNC: 9.1 MG/DL
CHLORIDE SERPL-SCNC: 104 MMOL/L
CHOLEST SERPL-MCNC: 142 MG/DL
CO2 SERPL-SCNC: 24 MMOL/L
CREAT SERPL-MCNC: 0.73 MG/DL
EGFR: 106 ML/MIN/1.73M2
EOSINOPHIL # BLD AUTO: 0.07 K/UL
EOSINOPHIL NFR BLD AUTO: 1.6 %
ESTIMATED AVERAGE GLUCOSE: 103 MG/DL
GLUCOSE SERPL-MCNC: 73 MG/DL
HBA1C MFR BLD HPLC: 5.2 %
HCT VFR BLD CALC: 36.2 %
HDLC SERPL-MCNC: 43 MG/DL
HGB BLD-MCNC: 11.6 G/DL
IMM GRANULOCYTES NFR BLD AUTO: 0 %
LDLC SERPL CALC-MCNC: 79 MG/DL
LYMPHOCYTES # BLD AUTO: 1.77 K/UL
LYMPHOCYTES NFR BLD AUTO: 40.2 %
MAN DIFF?: NORMAL
MCHC RBC-ENTMCNC: 27.6 PG
MCHC RBC-ENTMCNC: 32 GM/DL
MCV RBC AUTO: 86.2 FL
MONOCYTES # BLD AUTO: 0.36 K/UL
MONOCYTES NFR BLD AUTO: 8.2 %
NEUTROPHILS # BLD AUTO: 2.19 K/UL
NEUTROPHILS NFR BLD AUTO: 49.8 %
NONHDLC SERPL-MCNC: 99 MG/DL
PARATHYROID HORMONE INTACT: 23 PG/ML
PLATELET # BLD AUTO: 327 K/UL
POTASSIUM SERPL-SCNC: 4.1 MMOL/L
PROT SERPL-MCNC: 7.2 G/DL
RBC # BLD: 4.2 M/UL
RBC # FLD: 12.7 %
SODIUM SERPL-SCNC: 139 MMOL/L
T4 FREE SERPL-MCNC: 1.1 NG/DL
TRIGL SERPL-MCNC: 111 MG/DL
TSH SERPL-ACNC: 1.36 UIU/ML
VIT B12 SERPL-MCNC: 393 PG/ML
WBC # FLD AUTO: 4.4 K/UL

## 2024-05-17 RX ORDER — ERGOCALCIFEROL 1.25 MG/1
1.25 MG CAPSULE, LIQUID FILLED ORAL
Qty: 12 | Refills: 0 | Status: ACTIVE | COMMUNITY
Start: 2024-05-17 | End: 1900-01-01

## 2024-05-26 PROBLEM — E55.9 VITAMIN D INSUFFICIENCY: Status: ACTIVE | Noted: 2021-08-04

## 2024-05-26 PROBLEM — D35.1 PARATHYROID ADENOMA: Status: ACTIVE | Noted: 2023-11-28

## 2024-05-26 PROBLEM — R73.03 PREDIABETES: Status: ACTIVE | Noted: 2023-10-31

## 2024-05-26 PROBLEM — E83.52 HYPERCALCEMIA: Status: ACTIVE | Noted: 2023-10-26

## 2024-05-26 NOTE — ASSESSMENT
[FreeTextEntry1] : This is a 43-year-old female with multinodular goiter s/p right lobectomy/ isthmectomy on July 4, 2023 with Dr. Chen at [facility] [place], morbid obesity, prediabetes, hypercalcemia, hypercalciuria, elevated PTH level, vitamin D deficiency, anemia, hiatal hernia, GERD, asthma, schwannoma s/p craniotomy/leigha hole/ CSF leak here for evaluation.    She underwent right lobectomy /isthmectomy on July 2023 due to multinodular goiter with tracheal displacement. Pathology showed follicular nodular disease.   1. Hypothyroidism  Check TFTs.  Continue Synthroid 25 mcg daily pending results.  2. Hypercalcemia FeCa 0.017 Thyroid ultrasound at [facility] from November 2023 showed unremarkable right thyroidectomy bed, left thyroid nodules including upper pole 1.1 x 1.1 x 0.7 cm (TR3), mid pole 1.5 x 1.3 x 0.8 cm (TR3), and a 1.7 x 1.4 x 1.2 cm hypoechoic nodule immediately inferior to the left lower pole suggestive of left inferior parathyroid adenoma.  She underwent left inferior parathyroidectomy on April 9th 2024.  She is on TUMS 2000mg daily Check 25 vitamin D, CMP, PTH.  3. Prediabetes Check hbA1c.  LSM On Zepbound 12.5mg weekly

## 2024-05-26 NOTE — REVIEW OF SYSTEMS
[All other systems negative] : All other systems negative [Headaches] : headaches [FreeTextEntry2] : low energy [FreeTextEntry4] : neck tightness [FreeTextEntry6] : dry throat

## 2024-05-26 NOTE — HISTORY OF PRESENT ILLNESS
[FreeTextEntry1] : CC: Hyperthyroidism  This is a 43-year-old female with multinodular goiter s/p right lobectomy/ isthmusectomy on July 4, 2023 with Dr. Chen at [facility] [place], morbid obesity, prediabetes, hypercalcemia, hypercalciuria, elevated PTH level, vitamin D deficiency, anemia, hiatal hernia, GERD, asthma, schwannoma s/p craniotomy/leigha hole/ CSF leak, here for a follow-up.    Multinodular goiter was diagnosed in 2014 after she discovered swelling in anterior neck. She had FNA of likely 2 thyroid nodules which were benign. She underwent right lobectomy /isthmusectomy on July 4, 2023 due to multinodular goiter with tracheal displacement. Pathology showed follicular nodular disease. She is taking Synthroid 50 mcg daily which was started in late September.  Blood work from hematologist showed hypercalcemia. Of note PTH on September 1, 2023 also found to be elevated (142.9). She had one prior episode of hypercalcemia in the past. Denies kidney stones. She is taking ergocalciferol 50,000 IU weekly.  She is not taking calcium supplements. FeCa 0.017.  Prior records from [facility] reviewed. Preoperative thyroid ultrasound showed left thyroid lobe with multiple thyroid nodules and an additional heterogeneous ovoid focus inferior to the left thyroid lobe, probably ectopic thyroid measuring 1.8 x 1.3 x 1.5 cm. Preoperative CT neck also noted nodular mediastinal tissue at the left prevascular plane measuring 1.6 x 1.4, probably representing exophytic thyroid nodule, less likely residual thymic tissue. Thyroid ultrasound at [facility] from November 2023 showed unremarkable right thyroidectomy bed, left thyroid nodules including upper pole 1.1 x 1.1 x 0.7 cm (TR3), mid pole 1.5 x 1.3 x 0.8 cm (TR3), and a 1.7 x 1.4 x 1.2 cm hypoechoic nodule immediately inferior to the left lower pole suggestive of left inferior parathyroid adenoma.  She underwent left inferior parathyroidectomy on April 9th, 2024.  Reports dry throat and low energy.

## 2024-05-26 NOTE — ADDENDUM
[FreeTextEntry1] :  By signing my name below, I, Krista العلي, attest that this document has been prepared under the direction and in the presence of Dr. Davis.  I, Kaila Davis MD, personally performed the services described in this documentation. All medical record entries made by the scribe were at my discretion and in my presence. I have reviewed the chart and discharge instructions (if applicable) and agree that the record reflects my personal permanence and is accurate and complete.

## 2024-06-04 ENCOUNTER — RX RENEWAL (OUTPATIENT)
Age: 44
End: 2024-06-04

## 2024-06-04 RX ORDER — LEVOTHYROXINE SODIUM 25 UG/1
25 TABLET ORAL DAILY
Qty: 90 | Refills: 2 | Status: ACTIVE | COMMUNITY
Start: 2023-12-27 | End: 1900-01-01

## 2024-07-09 NOTE — ED PROVIDER NOTE - CROS ED NEURO ALL NEG
Problem: At Risk for Falls  Goal: Patient does not fall  Outcome: Monitoring/Evaluating progress     Problem: At Risk for Injury Due to Fall  Goal: Patient does not fall  Outcome: Monitoring/Evaluating progress     Problem: Pain  Goal: Acceptable pain level achieved/maintained at rest using appropriate pain scale for the patient  Outcome: Not met at discharge, continue plan   PRN given per pt request    negative...

## 2024-10-14 ENCOUNTER — RX RENEWAL (OUTPATIENT)
Age: 44
End: 2024-10-14

## 2024-10-15 ENCOUNTER — RX RENEWAL (OUTPATIENT)
Age: 44
End: 2024-10-15

## 2024-11-07 ENCOUNTER — APPOINTMENT (OUTPATIENT)
Dept: ENDOCRINOLOGY | Facility: CLINIC | Age: 44
End: 2024-11-07

## 2024-11-07 DIAGNOSIS — E55.9 VITAMIN D DEFICIENCY, UNSPECIFIED: ICD-10-CM

## 2024-11-07 DIAGNOSIS — E89.0 POSTPROCEDURAL HYPOTHYROIDISM: ICD-10-CM

## 2024-11-07 DIAGNOSIS — R79.89 OTHER SPECIFIED ABNORMAL FINDINGS OF BLOOD CHEMISTRY: ICD-10-CM

## 2024-11-07 DIAGNOSIS — Z86.39 PERSONAL HISTORY OF OTHER ENDOCRINE, NUTRITIONAL AND METABOLIC DISEASE: ICD-10-CM

## 2024-11-07 DIAGNOSIS — R73.03 PREDIABETES.: ICD-10-CM

## 2024-11-07 DIAGNOSIS — Z86.018 PERSONAL HISTORY OF OTHER BENIGN NEOPLASM: ICD-10-CM

## 2024-11-07 PROCEDURE — 99441: CPT

## 2024-12-20 ENCOUNTER — APPOINTMENT (OUTPATIENT)
Dept: INTERNAL MEDICINE | Facility: CLINIC | Age: 44
End: 2024-12-20
Payer: COMMERCIAL

## 2024-12-20 ENCOUNTER — NON-APPOINTMENT (OUTPATIENT)
Age: 44
End: 2024-12-20

## 2024-12-20 ENCOUNTER — LABORATORY RESULT (OUTPATIENT)
Age: 44
End: 2024-12-20

## 2024-12-20 VITALS
WEIGHT: 176 LBS | DIASTOLIC BLOOD PRESSURE: 84 MMHG | TEMPERATURE: 97 F | HEIGHT: 62 IN | BODY MASS INDEX: 32.39 KG/M2 | SYSTOLIC BLOOD PRESSURE: 129 MMHG | OXYGEN SATURATION: 99 % | HEART RATE: 98 BPM

## 2024-12-20 DIAGNOSIS — R10.32 LEFT LOWER QUADRANT PAIN: ICD-10-CM

## 2024-12-20 DIAGNOSIS — J32.9 CHRONIC SINUSITIS, UNSPECIFIED: ICD-10-CM

## 2024-12-20 DIAGNOSIS — Z00.00 ENCOUNTER FOR GENERAL ADULT MEDICAL EXAMINATION W/OUT ABNORMAL FINDINGS: ICD-10-CM

## 2024-12-20 DIAGNOSIS — Z11.3 ENCOUNTER FOR SCREENING FOR INFECTIONS WITH A PREDOMINANTLY SEXUAL MODE OF TRANSMISSION: ICD-10-CM

## 2024-12-20 PROCEDURE — 99396 PREV VISIT EST AGE 40-64: CPT

## 2024-12-20 PROCEDURE — G0444 DEPRESSION SCREEN ANNUAL: CPT | Mod: 59

## 2024-12-20 RX ORDER — FLUTICASONE PROPIONATE AND SALMETEROL 250; 50 UG/1; UG/1
250-50 POWDER RESPIRATORY (INHALATION)
Qty: 60 | Refills: 2 | Status: ACTIVE | COMMUNITY
Start: 2024-12-20 | End: 1900-01-01

## 2024-12-20 RX ORDER — AMOXICILLIN AND CLAVULANATE POTASSIUM 875; 125 MG/1; MG/1
875-125 TABLET, COATED ORAL
Qty: 14 | Refills: 0 | Status: ACTIVE | COMMUNITY
Start: 2024-12-20 | End: 1900-01-01

## 2024-12-27 DIAGNOSIS — B37.9 CANDIDIASIS, UNSPECIFIED: ICD-10-CM

## 2024-12-27 LAB
25(OH)D3 SERPL-MCNC: 24.1 NG/ML
ALBUMIN SERPL ELPH-MCNC: 4.4 G/DL
ALP BLD-CCNC: 96 U/L
ALT SERPL-CCNC: 14 U/L
ANION GAP SERPL CALC-SCNC: 10 MMOL/L
APPEARANCE: CLEAR
AST SERPL-CCNC: 13 U/L
BASOPHILS # BLD AUTO: 0.02 K/UL
BASOPHILS NFR BLD AUTO: 0.4 %
BILIRUB SERPL-MCNC: 0.5 MG/DL
BILIRUBIN URINE: NEGATIVE
BLOOD URINE: NEGATIVE
BUN SERPL-MCNC: 11 MG/DL
C TRACH RRNA SPEC QL NAA+PROBE: NOT DETECTED
CALCIUM SERPL-MCNC: 8.8 MG/DL
CHLORIDE SERPL-SCNC: 106 MMOL/L
CHOLEST SERPL-MCNC: 117 MG/DL
CO2 SERPL-SCNC: 23 MMOL/L
COLOR: NORMAL
CREAT SERPL-MCNC: 0.69 MG/DL
EGFR: 111 ML/MIN/1.73M2
EOSINOPHIL # BLD AUTO: 0.02 K/UL
EOSINOPHIL NFR BLD AUTO: 0.4 %
ESTIMATED AVERAGE GLUCOSE: 91 MG/DL
GLUCOSE QUALITATIVE U: NEGATIVE MG/DL
GLUCOSE SERPL-MCNC: 78 MG/DL
HAV IGM SER QL: NONREACTIVE
HBA1C MFR BLD HPLC: 4.8 %
HBV CORE IGM SER QL: NONREACTIVE
HBV SURFACE AG SER QL: NONREACTIVE
HCT VFR BLD CALC: 40 %
HCV AB SER QL: NONREACTIVE
HCV S/CO RATIO: 0.15 S/CO
HDLC SERPL-MCNC: 37 MG/DL
HGB BLD-MCNC: 13.1 G/DL
HIV1+2 AB SPEC QL IA.RAPID: NONREACTIVE
IMM GRANULOCYTES NFR BLD AUTO: 0.2 %
KETONES URINE: ABNORMAL MG/DL
LDLC SERPL CALC-MCNC: 70 MG/DL
LEUKOCYTE ESTERASE URINE: ABNORMAL
LYMPHOCYTES # BLD AUTO: 1.31 K/UL
LYMPHOCYTES NFR BLD AUTO: 27.5 %
MAN DIFF?: NORMAL
MCHC RBC-ENTMCNC: 28.9 PG
MCHC RBC-ENTMCNC: 32.8 G/DL
MCV RBC AUTO: 88.1 FL
MONOCYTES # BLD AUTO: 0.36 K/UL
MONOCYTES NFR BLD AUTO: 7.6 %
MYCOPLASMA HOMINIS CULTURE: NEGATIVE
N GONORRHOEA RRNA SPEC QL NAA+PROBE: NOT DETECTED
NEUTROPHILS # BLD AUTO: 3.04 K/UL
NEUTROPHILS NFR BLD AUTO: 63.9 %
NITRITE URINE: NEGATIVE
NONHDLC SERPL-MCNC: 80 MG/DL
PH URINE: 6
PLATELET # BLD AUTO: 288 K/UL
POTASSIUM SERPL-SCNC: 4.3 MMOL/L
PROT SERPL-MCNC: 6.9 G/DL
PROTEIN URINE: 30 MG/DL
RBC # BLD: 4.54 M/UL
RBC # FLD: 17 %
SODIUM SERPL-SCNC: 139 MMOL/L
SOURCE AMPLIFICATION: NORMAL
SPECIFIC GRAVITY URINE: 1.03
T PALLIDUM AB SER QL IA: NEGATIVE
T4 FREE SERPL-MCNC: 1.2 NG/DL
TRIGL SERPL-MCNC: 36 MG/DL
TSH SERPL-ACNC: 0.95 UIU/ML
UREAPLASMA CULTURE: NEGATIVE
UROBILINOGEN URINE: 1 MG/DL
VIT B12 SERPL-MCNC: 304 PG/ML
WBC # FLD AUTO: 4.76 K/UL

## 2024-12-27 RX ORDER — FLUCONAZOLE 150 MG/1
150 TABLET ORAL
Qty: 2 | Refills: 0 | Status: ACTIVE | COMMUNITY
Start: 2024-12-27 | End: 1900-01-01

## 2024-12-28 ENCOUNTER — OUTPATIENT (OUTPATIENT)
Dept: OUTPATIENT SERVICES | Facility: HOSPITAL | Age: 44
LOS: 1 days | End: 2024-12-28
Payer: COMMERCIAL

## 2024-12-28 ENCOUNTER — APPOINTMENT (OUTPATIENT)
Dept: ULTRASOUND IMAGING | Facility: CLINIC | Age: 44
End: 2024-12-28
Payer: COMMERCIAL

## 2024-12-28 DIAGNOSIS — Z98.89 OTHER SPECIFIED POSTPROCEDURAL STATES: Chronic | ICD-10-CM

## 2024-12-28 DIAGNOSIS — R10.32 LEFT LOWER QUADRANT PAIN: ICD-10-CM

## 2024-12-28 DIAGNOSIS — Z98.890 OTHER SPECIFIED POSTPROCEDURAL STATES: Chronic | ICD-10-CM

## 2024-12-28 DIAGNOSIS — Z98.891 HISTORY OF UTERINE SCAR FROM PREVIOUS SURGERY: Chronic | ICD-10-CM

## 2024-12-28 DIAGNOSIS — Z90.89 ACQUIRED ABSENCE OF OTHER ORGANS: Chronic | ICD-10-CM

## 2024-12-28 PROCEDURE — 76700 US EXAM ABDOM COMPLETE: CPT | Mod: 26

## 2024-12-28 PROCEDURE — 76830 TRANSVAGINAL US NON-OB: CPT

## 2024-12-28 PROCEDURE — 76700 US EXAM ABDOM COMPLETE: CPT

## 2024-12-28 PROCEDURE — 76830 TRANSVAGINAL US NON-OB: CPT | Mod: 26

## 2025-01-06 NOTE — ED ADULT NURSE NOTE - NS ED PATIENT SAFETY CONCERN
Detail Level: Detailed
General Sunscreen Counseling: I recommended a broad spectrum sunscreen with a SPF of 30 or higher. Sun protective clothing can be used in lieu of sunscreen but must be worn the entire time you are exposed to the sun's rays.
No

## 2025-01-14 ENCOUNTER — RX RENEWAL (OUTPATIENT)
Age: 45
End: 2025-01-14

## 2025-06-20 ENCOUNTER — NON-APPOINTMENT (OUTPATIENT)
Age: 45
End: 2025-06-20

## 2025-06-24 ENCOUNTER — RX RENEWAL (OUTPATIENT)
Age: 45
End: 2025-06-24

## 2025-07-02 NOTE — H&P PST ADULT - SURGICAL SITE INCISION
July 2, 2025     Patient: Paolo Monge  YOB: 1990  Date of Visit: 7/2/2025      To Whom it May Concern:    Paolo Monge is under my professional care. Paolo was seen in my office on 7/2/2025. Paolo may return to work on 07/07/25. Please excuse Paolo from 06/30/25 - 07/06/25.     If you have any questions or concerns, please don't hesitate to call.         Sincerely,          TARA Keith        CC: No Recipients  
no

## 2025-09-02 ENCOUNTER — RX RENEWAL (OUTPATIENT)
Age: 45
End: 2025-09-02